# Patient Record
Sex: FEMALE | Race: BLACK OR AFRICAN AMERICAN | Employment: OTHER | URBAN - METROPOLITAN AREA
[De-identification: names, ages, dates, MRNs, and addresses within clinical notes are randomized per-mention and may not be internally consistent; named-entity substitution may affect disease eponyms.]

---

## 2018-12-11 ENCOUNTER — HOSPITAL ENCOUNTER (OUTPATIENT)
Dept: GENERAL RADIOLOGY | Age: 63
Discharge: HOME | End: 2018-12-11
Attending: INTERNAL MEDICINE
Payer: COMMERCIAL

## 2018-12-11 DIAGNOSIS — M54.9 BACK PAIN: ICD-10-CM

## 2018-12-11 DIAGNOSIS — M79.605 LEFT LEG PAIN: ICD-10-CM

## 2018-12-11 PROCEDURE — 72100 X-RAY EXAM L-S SPINE 2/3 VWS: CPT

## 2018-12-11 PROCEDURE — 73502 X-RAY EXAM HIP UNI 2-3 VIEWS: CPT

## 2019-05-07 ENCOUNTER — HOSPITAL ENCOUNTER (OUTPATIENT)
Dept: MAMMOGRAPHY | Age: 64
Discharge: HOME | End: 2019-05-07
Attending: INTERNAL MEDICINE
Payer: COMMERCIAL

## 2019-05-07 DIAGNOSIS — Z12.39 SCREENING FOR BREAST CANCER: ICD-10-CM

## 2019-05-07 PROCEDURE — 77063 BREAST TOMOSYNTHESIS BI: CPT

## 2019-07-11 ENCOUNTER — HOSPITAL ENCOUNTER (INPATIENT)
Age: 64
LOS: 3 days | Discharge: HOME | DRG: 250 | End: 2019-07-14
Attending: INTERNAL MEDICINE | Admitting: INTERNAL MEDICINE
Payer: OTHER GOVERNMENT

## 2019-07-11 ENCOUNTER — HOSPITAL ENCOUNTER (EMERGENCY)
Age: 64
Discharge: ANOTHER HOSPITAL | DRG: 250 | End: 2019-07-11
Attending: EMERGENCY MEDICINE | Admitting: EMERGENCY MEDICINE
Payer: OTHER GOVERNMENT

## 2019-07-11 VITALS
HEART RATE: 83 BPM | OXYGEN SATURATION: 96 % | SYSTOLIC BLOOD PRESSURE: 202 MMHG | TEMPERATURE: 98.8 F | WEIGHT: 190 LBS | HEIGHT: 64 IN | RESPIRATION RATE: 33 BRPM | BODY MASS INDEX: 32.44 KG/M2 | DIASTOLIC BLOOD PRESSURE: 96 MMHG

## 2019-07-11 DIAGNOSIS — I21.3 ACUTE ST ELEVATION MYOCARDIAL INFARCTION (STEMI), UNSPECIFIED ARTERY (HCC): Primary | ICD-10-CM

## 2019-07-11 PROBLEM — I10 ESSENTIAL HYPERTENSION: Status: ACTIVE | Noted: 2019-07-11

## 2019-07-11 PROBLEM — E78.2 MIXED HYPERLIPIDEMIA: Status: ACTIVE | Noted: 2019-07-11

## 2019-07-11 LAB
ACT BLD: 312 SECS (ref 70–128)
ALBUMIN SERPL-MCNC: 4.3 G/DL (ref 3.2–4.6)
ALBUMIN/GLOB SERPL: 1.1 {RATIO} (ref 1.2–3.5)
ALP SERPL-CCNC: 134 U/L (ref 50–130)
ALT SERPL-CCNC: 25 U/L (ref 12–65)
ANION GAP SERPL CALC-SCNC: 7 MMOL/L (ref 7–16)
APTT PPP: 35 SEC (ref 24.7–39.8)
AST SERPL-CCNC: 16 U/L (ref 15–37)
ATRIAL RATE: 62 BPM
BASOPHILS # BLD: 0 K/UL (ref 0–0.2)
BASOPHILS NFR BLD: 0 % (ref 0–2)
BILIRUB SERPL-MCNC: 0.2 MG/DL (ref 0.2–1.1)
BUN SERPL-MCNC: 12 MG/DL (ref 8–23)
CALCIUM SERPL-MCNC: 9.5 MG/DL (ref 8.3–10.4)
CALCULATED P AXIS, ECG09: 57 DEGREES
CALCULATED R AXIS, ECG10: 5 DEGREES
CALCULATED T AXIS, ECG11: -7 DEGREES
CHLORIDE SERPL-SCNC: 106 MMOL/L (ref 98–107)
CO2 SERPL-SCNC: 30 MMOL/L (ref 21–32)
CREAT SERPL-MCNC: 0.71 MG/DL (ref 0.6–1)
DIAGNOSIS, 93000: NORMAL
DIFFERENTIAL METHOD BLD: ABNORMAL
EOSINOPHIL # BLD: 0.3 K/UL (ref 0–0.8)
EOSINOPHIL NFR BLD: 4 % (ref 0.5–7.8)
ERYTHROCYTE [DISTWIDTH] IN BLOOD BY AUTOMATED COUNT: 14.9 % (ref 11.9–14.6)
GLOBULIN SER CALC-MCNC: 4 G/DL (ref 2.3–3.5)
GLUCOSE SERPL-MCNC: 106 MG/DL (ref 65–100)
HCT VFR BLD AUTO: 44.5 % (ref 35.8–46.3)
HGB BLD-MCNC: 13.6 G/DL (ref 11.7–15.4)
IMM GRANULOCYTES # BLD AUTO: 0 K/UL (ref 0–0.5)
IMM GRANULOCYTES NFR BLD AUTO: 0 % (ref 0–5)
INR PPP: 1
LYMPHOCYTES # BLD: 2.5 K/UL (ref 0.5–4.6)
LYMPHOCYTES NFR BLD: 30 % (ref 13–44)
MCH RBC QN AUTO: 26.8 PG (ref 26.1–32.9)
MCHC RBC AUTO-ENTMCNC: 30.6 G/DL (ref 31.4–35)
MCV RBC AUTO: 87.8 FL (ref 79.6–97.8)
MONOCYTES # BLD: 0.6 K/UL (ref 0.1–1.3)
MONOCYTES NFR BLD: 7 % (ref 4–12)
NEUTS SEG # BLD: 4.9 K/UL (ref 1.7–8.2)
NEUTS SEG NFR BLD: 59 % (ref 43–78)
NRBC # BLD: 0 K/UL (ref 0–0.2)
P-R INTERVAL, ECG05: 166 MS
PLATELET # BLD AUTO: 198 K/UL (ref 150–450)
PMV BLD AUTO: 12 FL (ref 9.4–12.3)
POTASSIUM SERPL-SCNC: 4.2 MMOL/L (ref 3.5–5.1)
PROT SERPL-MCNC: 8.3 G/DL (ref 6.3–8.2)
PROTHROMBIN TIME: 12.8 SEC (ref 11.7–14.5)
Q-T INTERVAL, ECG07: 402 MS
QRS DURATION, ECG06: 78 MS
QTC CALCULATION (BEZET), ECG08: 408 MS
RBC # BLD AUTO: 5.07 M/UL (ref 4.05–5.2)
SODIUM SERPL-SCNC: 143 MMOL/L (ref 136–145)
TROPONIN I BLD-MCNC: 0 NG/ML (ref 0.02–0.05)
TROPONIN I SERPL-MCNC: <0.02 NG/ML (ref 0.02–0.05)
VENTRICULAR RATE, ECG03: 62 BPM
WBC # BLD AUTO: 8.3 K/UL (ref 4.3–11.1)

## 2019-07-11 PROCEDURE — 99153 MOD SED SAME PHYS/QHP EA: CPT

## 2019-07-11 PROCEDURE — 74011250637 HC RX REV CODE- 250/637: Performed by: EMERGENCY MEDICINE

## 2019-07-11 PROCEDURE — 74011250636 HC RX REV CODE- 250/636: Performed by: INTERNAL MEDICINE

## 2019-07-11 PROCEDURE — C1725 CATH, TRANSLUMIN NON-LASER: HCPCS

## 2019-07-11 PROCEDURE — 84484 ASSAY OF TROPONIN QUANT: CPT

## 2019-07-11 PROCEDURE — C1769 GUIDE WIRE: HCPCS

## 2019-07-11 PROCEDURE — 93458 L HRT ARTERY/VENTRICLE ANGIO: CPT

## 2019-07-11 PROCEDURE — 77030019605

## 2019-07-11 PROCEDURE — 77030004534 HC CATH ANGI DX INFN CARD -A

## 2019-07-11 PROCEDURE — C1887 CATHETER, GUIDING: HCPCS

## 2019-07-11 PROCEDURE — 74011636320 HC RX REV CODE- 636/320: Performed by: INTERNAL MEDICINE

## 2019-07-11 PROCEDURE — 77010033678 HC OXYGEN DAILY

## 2019-07-11 PROCEDURE — B2111ZZ FLUOROSCOPY OF MULTIPLE CORONARY ARTERIES USING LOW OSMOLAR CONTRAST: ICD-10-PCS | Performed by: INTERNAL MEDICINE

## 2019-07-11 PROCEDURE — 99152 MOD SED SAME PHYS/QHP 5/>YRS: CPT

## 2019-07-11 PROCEDURE — 74011250637 HC RX REV CODE- 250/637: Performed by: INTERNAL MEDICINE

## 2019-07-11 PROCEDURE — 85730 THROMBOPLASTIN TIME PARTIAL: CPT

## 2019-07-11 PROCEDURE — 96365 THER/PROPH/DIAG IV INF INIT: CPT | Performed by: EMERGENCY MEDICINE

## 2019-07-11 PROCEDURE — 74011250636 HC RX REV CODE- 250/636

## 2019-07-11 PROCEDURE — 93005 ELECTROCARDIOGRAM TRACING: CPT | Performed by: EMERGENCY MEDICINE

## 2019-07-11 PROCEDURE — 85025 COMPLETE CBC W/AUTO DIFF WBC: CPT

## 2019-07-11 PROCEDURE — B2151ZZ FLUOROSCOPY OF LEFT HEART USING LOW OSMOLAR CONTRAST: ICD-10-PCS | Performed by: INTERNAL MEDICINE

## 2019-07-11 PROCEDURE — 96375 TX/PRO/DX INJ NEW DRUG ADDON: CPT | Performed by: EMERGENCY MEDICINE

## 2019-07-11 PROCEDURE — 77030019569 HC BND COMPR RAD TERU -B

## 2019-07-11 PROCEDURE — 96365 THER/PROPH/DIAG IV INF INIT: CPT

## 2019-07-11 PROCEDURE — 77030015766

## 2019-07-11 PROCEDURE — 85610 PROTHROMBIN TIME: CPT

## 2019-07-11 PROCEDURE — 77030020263 HC SOL INJ SOD CL0.9% LFCR 1000ML

## 2019-07-11 PROCEDURE — 92941 PRQ TRLML REVSC TOT OCCL AMI: CPT

## 2019-07-11 PROCEDURE — 74011000250 HC RX REV CODE- 250: Performed by: EMERGENCY MEDICINE

## 2019-07-11 PROCEDURE — 99285 EMERGENCY DEPT VISIT HI MDM: CPT | Performed by: EMERGENCY MEDICINE

## 2019-07-11 PROCEDURE — 4A023N7 MEASUREMENT OF CARDIAC SAMPLING AND PRESSURE, LEFT HEART, PERCUTANEOUS APPROACH: ICD-10-PCS | Performed by: INTERNAL MEDICINE

## 2019-07-11 PROCEDURE — 94760 N-INVAS EAR/PLS OXIMETRY 1: CPT

## 2019-07-11 PROCEDURE — C1894 INTRO/SHEATH, NON-LASER: HCPCS

## 2019-07-11 PROCEDURE — 85347 COAGULATION TIME ACTIVATED: CPT

## 2019-07-11 PROCEDURE — 74011250636 HC RX REV CODE- 250/636: Performed by: EMERGENCY MEDICINE

## 2019-07-11 PROCEDURE — 02703ZZ DILATION OF CORONARY ARTERY, ONE ARTERY, PERCUTANEOUS APPROACH: ICD-10-PCS | Performed by: INTERNAL MEDICINE

## 2019-07-11 PROCEDURE — 75810000275 HC EMERGENCY DEPT VISIT NO LEVEL OF CARE: Performed by: EMERGENCY MEDICINE

## 2019-07-11 PROCEDURE — 80053 COMPREHEN METABOLIC PANEL: CPT

## 2019-07-11 PROCEDURE — 65620000000 HC RM CCU GENERAL

## 2019-07-11 PROCEDURE — 74011000250 HC RX REV CODE- 250: Performed by: INTERNAL MEDICINE

## 2019-07-11 RX ORDER — NITROGLYCERIN 0.4 MG/1
0.4 TABLET SUBLINGUAL
Status: DISCONTINUED | OUTPATIENT
Start: 2019-07-11 | End: 2019-07-11 | Stop reason: HOSPADM

## 2019-07-11 RX ORDER — METOPROLOL SUCCINATE 50 MG/1
TABLET, EXTENDED RELEASE ORAL DAILY
COMMUNITY
End: 2019-07-18

## 2019-07-11 RX ORDER — GUAIFENESIN 100 MG/5ML
81 LIQUID (ML) ORAL DAILY
Status: DISCONTINUED | OUTPATIENT
Start: 2019-07-12 | End: 2019-07-14 | Stop reason: HOSPADM

## 2019-07-11 RX ORDER — MORPHINE SULFATE 2 MG/ML
2 INJECTION, SOLUTION INTRAMUSCULAR; INTRAVENOUS
Status: DISCONTINUED | OUTPATIENT
Start: 2019-07-11 | End: 2019-07-11

## 2019-07-11 RX ORDER — OXYCODONE AND ACETAMINOPHEN 5; 325 MG/1; MG/1
1 TABLET ORAL
Status: DISCONTINUED | OUTPATIENT
Start: 2019-07-11 | End: 2019-07-14 | Stop reason: HOSPADM

## 2019-07-11 RX ORDER — SODIUM CHLORIDE 0.9 % (FLUSH) 0.9 %
5-40 SYRINGE (ML) INJECTION EVERY 8 HOURS
Status: DISCONTINUED | OUTPATIENT
Start: 2019-07-11 | End: 2019-07-14 | Stop reason: HOSPADM

## 2019-07-11 RX ORDER — HYDROMORPHONE HYDROCHLORIDE 2 MG/ML
0.5 INJECTION, SOLUTION INTRAMUSCULAR; INTRAVENOUS; SUBCUTANEOUS
Status: COMPLETED | OUTPATIENT
Start: 2019-07-11 | End: 2019-07-11

## 2019-07-11 RX ORDER — MIDAZOLAM HYDROCHLORIDE 1 MG/ML
.5-2 INJECTION, SOLUTION INTRAMUSCULAR; INTRAVENOUS
Status: DISCONTINUED | OUTPATIENT
Start: 2019-07-11 | End: 2019-07-11

## 2019-07-11 RX ORDER — GUAIFENESIN 100 MG/5ML
324 LIQUID (ML) ORAL
Status: COMPLETED | OUTPATIENT
Start: 2019-07-11 | End: 2019-07-11

## 2019-07-11 RX ORDER — ROSUVASTATIN CALCIUM 20 MG/1
40 TABLET, COATED ORAL
Status: DISCONTINUED | OUTPATIENT
Start: 2019-07-11 | End: 2019-07-14 | Stop reason: HOSPADM

## 2019-07-11 RX ORDER — CLOPIDOGREL BISULFATE 75 MG/1
600 TABLET ORAL ONCE
Status: COMPLETED | OUTPATIENT
Start: 2019-07-11 | End: 2019-07-11

## 2019-07-11 RX ORDER — NITROGLYCERIN 20 MG/100ML
5 INJECTION INTRAVENOUS
Status: COMPLETED | OUTPATIENT
Start: 2019-07-11 | End: 2019-07-11

## 2019-07-11 RX ORDER — METOPROLOL SUCCINATE 50 MG/1
25 TABLET, EXTENDED RELEASE ORAL 2 TIMES DAILY
Status: DISCONTINUED | OUTPATIENT
Start: 2019-07-11 | End: 2019-07-14

## 2019-07-11 RX ORDER — HEPARIN SODIUM 10000 [USP'U]/ML
1000-10000 INJECTION, SOLUTION INTRAVENOUS; SUBCUTANEOUS
Status: DISCONTINUED | OUTPATIENT
Start: 2019-07-11 | End: 2019-07-11

## 2019-07-11 RX ORDER — LISINOPRIL 5 MG/1
5 TABLET ORAL 2 TIMES DAILY
Status: DISCONTINUED | OUTPATIENT
Start: 2019-07-11 | End: 2019-07-12

## 2019-07-11 RX ORDER — MAG HYDROX/ALUMINUM HYD/SIMETH 200-200-20
30 SUSPENSION, ORAL (FINAL DOSE FORM) ORAL AS NEEDED
Status: DISCONTINUED | OUTPATIENT
Start: 2019-07-11 | End: 2019-07-11

## 2019-07-11 RX ORDER — SODIUM CHLORIDE 9 MG/ML
75 INJECTION, SOLUTION INTRAVENOUS CONTINUOUS
Status: DISCONTINUED | OUTPATIENT
Start: 2019-07-11 | End: 2019-07-13

## 2019-07-11 RX ORDER — NITROGLYCERIN 20 MG/100ML
0-200 INJECTION INTRAVENOUS
Status: DISCONTINUED | OUTPATIENT
Start: 2019-07-11 | End: 2019-07-13

## 2019-07-11 RX ORDER — ACETAMINOPHEN 325 MG/1
650 TABLET ORAL
Status: DISCONTINUED | OUTPATIENT
Start: 2019-07-11 | End: 2019-07-14 | Stop reason: HOSPADM

## 2019-07-11 RX ORDER — CLOPIDOGREL BISULFATE 75 MG/1
75 TABLET ORAL DAILY
Status: DISCONTINUED | OUTPATIENT
Start: 2019-07-12 | End: 2019-07-14 | Stop reason: HOSPADM

## 2019-07-11 RX ORDER — LIDOCAINE HYDROCHLORIDE 10 MG/ML
2-20 INJECTION INFILTRATION; PERINEURAL
Status: DISCONTINUED | OUTPATIENT
Start: 2019-07-11 | End: 2019-07-11

## 2019-07-11 RX ORDER — HEPARIN SODIUM 200 [USP'U]/100ML
2 INJECTION, SOLUTION INTRAVENOUS CONTINUOUS
Status: DISCONTINUED | OUTPATIENT
Start: 2019-07-11 | End: 2019-07-11

## 2019-07-11 RX ORDER — NITROGLYCERIN 0.4 MG/1
0.4 TABLET SUBLINGUAL
Status: DISCONTINUED | OUTPATIENT
Start: 2019-07-11 | End: 2019-07-14 | Stop reason: HOSPADM

## 2019-07-11 RX ORDER — FENTANYL CITRATE 50 UG/ML
25-100 INJECTION, SOLUTION INTRAMUSCULAR; INTRAVENOUS
Status: DISCONTINUED | OUTPATIENT
Start: 2019-07-11 | End: 2019-07-11

## 2019-07-11 RX ORDER — PROMETHAZINE HYDROCHLORIDE 25 MG/1
25 TABLET ORAL
Status: DISCONTINUED | OUTPATIENT
Start: 2019-07-11 | End: 2019-07-14 | Stop reason: HOSPADM

## 2019-07-11 RX ORDER — HEPARIN SODIUM 5000 [USP'U]/ML
4000 INJECTION, SOLUTION INTRAVENOUS; SUBCUTANEOUS
Status: COMPLETED | OUTPATIENT
Start: 2019-07-11 | End: 2019-07-11

## 2019-07-11 RX ORDER — SODIUM CHLORIDE 0.9 % (FLUSH) 0.9 %
5-40 SYRINGE (ML) INJECTION AS NEEDED
Status: DISCONTINUED | OUTPATIENT
Start: 2019-07-11 | End: 2019-07-14 | Stop reason: HOSPADM

## 2019-07-11 RX ADMIN — NITROGLYCERIN 0.4 MG: 0.4 TABLET SUBLINGUAL at 18:31

## 2019-07-11 RX ADMIN — HEPARIN SODIUM 4000 UNITS: 10000 INJECTION INTRAVENOUS; SUBCUTANEOUS at 19:26

## 2019-07-11 RX ADMIN — PROMETHAZINE HYDROCHLORIDE 25 MG: 25 TABLET ORAL at 23:51

## 2019-07-11 RX ADMIN — LISINOPRIL 5 MG: 5 TABLET ORAL at 21:06

## 2019-07-11 RX ADMIN — NITROGLYCERIN 0.4 MG: 0.4 TABLET SUBLINGUAL at 18:37

## 2019-07-11 RX ADMIN — IOPAMIDOL 260 ML: 755 INJECTION, SOLUTION INTRAVENOUS at 20:12

## 2019-07-11 RX ADMIN — LIDOCAINE HYDROCHLORIDE 4 ML: 10 INJECTION, SOLUTION INFILTRATION; PERINEURAL at 19:26

## 2019-07-11 RX ADMIN — SODIUM CHLORIDE 75 ML/HR: 900 INJECTION, SOLUTION INTRAVENOUS at 20:31

## 2019-07-11 RX ADMIN — Medication 10 ML: at 21:09

## 2019-07-11 RX ADMIN — FENTANYL CITRATE 25 MCG: 50 INJECTION, SOLUTION INTRAMUSCULAR; INTRAVENOUS at 19:27

## 2019-07-11 RX ADMIN — MIDAZOLAM HYDROCHLORIDE 2 MG: 1 INJECTION, SOLUTION INTRAMUSCULAR; INTRAVENOUS at 19:27

## 2019-07-11 RX ADMIN — NITROGLYCERIN 5 MCG/MIN: 20 INJECTION INTRAVENOUS at 18:33

## 2019-07-11 RX ADMIN — NITROGLYCERIN 0.15 MG: 200 INJECTION, SOLUTION INTRAVENOUS at 19:54

## 2019-07-11 RX ADMIN — TIROFIBAN 0.15 MCG/KG/MIN: 5 INJECTION, SOLUTION INTRAVENOUS at 19:58

## 2019-07-11 RX ADMIN — ASPIRIN 81 MG 324 MG: 81 TABLET ORAL at 18:31

## 2019-07-11 RX ADMIN — ALUMINUM HYDROXIDE, MAGNESIUM HYDROXIDE, AND SIMETHICONE 30 ML: 200; 200; 20 SUSPENSION ORAL at 20:10

## 2019-07-11 RX ADMIN — METOPROLOL SUCCINATE 25 MG: 50 TABLET, EXTENDED RELEASE ORAL at 21:03

## 2019-07-11 RX ADMIN — CLOPIDOGREL BISULFATE 600 MG: 75 TABLET ORAL at 20:10

## 2019-07-11 RX ADMIN — HEPARIN SODIUM 2 ML: 10000 INJECTION, SOLUTION INTRAVENOUS; SUBCUTANEOUS at 19:26

## 2019-07-11 RX ADMIN — HYDROMORPHONE HYDROCHLORIDE 0.5 MG: 2 INJECTION INTRAMUSCULAR; INTRAVENOUS; SUBCUTANEOUS at 18:59

## 2019-07-11 RX ADMIN — ROSUVASTATIN CALCIUM 40 MG: 20 TABLET, COATED ORAL at 21:03

## 2019-07-11 RX ADMIN — TIROFIBAN 0.15 MCG/KG/MIN: 5 INJECTION, SOLUTION INTRAVENOUS at 20:51

## 2019-07-11 RX ADMIN — HEPARIN SODIUM 2 ML/HR: 200 INJECTION, SOLUTION INTRAVENOUS at 19:20

## 2019-07-11 RX ADMIN — TIROFIBAN 2100 MCG: 3.75 INJECTION, SOLUTION INTRAVENOUS at 19:58

## 2019-07-11 RX ADMIN — HEPARIN SODIUM 4000 UNITS: 5000 INJECTION INTRAVENOUS; SUBCUTANEOUS at 18:32

## 2019-07-11 RX ADMIN — NITROGLYCERIN 30 MCG/MIN: 20 INJECTION INTRAVENOUS at 20:37

## 2019-07-11 NOTE — ED PROVIDER NOTES
81-year-old female 45 minutes substernal chest pain and pressure. No radiation. No nausea or vomiting or diaphoresis. No shortness of breath or pleurisy. No fever chills or cough. Perhaps 1 or 2 pains like this in the past.  Patient states catheterization of heart more than 10 years ago without stents. History of hypertension. No history of diabetes. No history DVT or PE. The history is provided by the patient. Chest Pain    This is a new problem. The current episode started less than 1 hour ago. The problem has not changed since onset. The pain is associated with exertion. The pain is moderate. The quality of the pain is described as pressure-like. The pain does not radiate. Pertinent negatives include no abdominal pain, no back pain, no cough, no diaphoresis, no fever, no headaches, no nausea, no palpitations, no shortness of breath and no vomiting. She has tried nothing for the symptoms. Risk factors include hypertension. Her past medical history is significant for aneurysm. Her past medical history does not include DVT. Procedural history includes cardiac catheterization. Pertinent negatives include no cardiac stents. Past Medical History:   Diagnosis Date    Hypertension        Past Surgical History:   Procedure Laterality Date    HX HYSTERECTOMY      2001         History reviewed. No pertinent family history.     Social History     Socioeconomic History    Marital status:      Spouse name: Not on file    Number of children: Not on file    Years of education: Not on file    Highest education level: Not on file   Occupational History    Not on file   Social Needs    Financial resource strain: Not on file    Food insecurity:     Worry: Not on file     Inability: Not on file    Transportation needs:     Medical: Not on file     Non-medical: Not on file   Tobacco Use    Smoking status: Never Smoker   Substance and Sexual Activity    Alcohol use: Yes     Comment: occ    Drug use: No    Sexual activity: Not on file   Lifestyle    Physical activity:     Days per week: Not on file     Minutes per session: Not on file    Stress: Not on file   Relationships    Social connections:     Talks on phone: Not on file     Gets together: Not on file     Attends [employer] service: Not on file     Active member of club or organization: Not on file     Attends meetings of clubs or organizations: Not on file     Relationship status: Not on file    Intimate partner violence:     Fear of current or ex partner: Not on file     Emotionally abused: Not on file     Physically abused: Not on file     Forced sexual activity: Not on file   Other Topics Concern    Not on file   Social History Narrative    Not on file         ALLERGIES: [place]yer oil-1000 [omega-3 fatty acids] and Penicillin v    Review of Systems   Constitutional: Negative for chills, diaphoresis and fever. Respiratory: Negative for cough and shortness of breath. Cardiovascular: Positive for chest pain. Negative for palpitations. Gastrointestinal: Negative for abdominal pain, nausea and vomiting. Musculoskeletal: Negative for back pain and neck pain. Skin: Negative for color change and rash. Neurological: Negative for syncope and headaches. All other systems reviewed and are negative. Vitals:    07/11/19 1825 07/11/19 1837   BP: (!) 223/111 (!) 201/117   Pulse: 69 81   Resp: 20    Temp: 98.8 °F (37.1 °C)    SpO2: 98%    Weight: 86.2 kg (190 lb)    Height: 5' 4\" (1.626 m)             Physical Exam   Constitutional: She is oriented to person, place, and time. She appears well-developed and well-nourished. No distress. HENT:   Head: Normocephalic and atraumatic. Right Ear: External ear normal.   Left Ear: External ear normal.   Mouth/Throat: Oropharynx is clear and moist. No oropharyngeal exudate. Eyes: Pupils are equal, round, and reactive to light. Conjunctivae and EOM are normal.   Neck: Normal range of motion.  Neck supple. Cardiovascular: Normal rate, regular rhythm and intact distal pulses. No murmur heard. Pulmonary/Chest: Breath sounds normal. No respiratory distress. Abdominal: Soft. Bowel sounds are normal. She exhibits no mass. There is no tenderness. There is no rebound and no guarding. No hernia. Neurological: She is alert and oriented to person, place, and time. Gait normal.   Nl speech   Skin: Skin is warm and dry. Psychiatric: She has a normal mood and affect. Her speech is normal.   Nursing note and vitals reviewed. MDM  Number of Diagnoses or Management Options  Diagnosis management comments: Check EKG and serial troponins. Pain control. Blood pressure control. Amount and/or Complexity of Data Reviewed  Clinical lab tests: ordered and reviewed  Tests in the medicine section of CPT®: ordered and reviewed  Discuss the patient with other providers: yes (Discussed with cardiology. Code STEMI called immediately upon review of EKG and brief discussion with patient.)  Independent visualization of images, tracings, or specimens: yes (EKG reveals ST elevation in V2, aVL. Nonspecific changes in 1. Inferior ST depression.)    Risk of Complications, Morbidity, and/or Mortality  Presenting problems: high  Diagnostic procedures: minimal  Management options: low    Patient Progress  Patient progress: stable         Procedures      6:42 PM  Patient started on sublingual then IV nitroglycerin for hypertensive emergency.

## 2019-07-11 NOTE — ED TRIAGE NOTES
Pt states chest pain for about 30 mins that started while sitting down. Denies cardiac hx but did see [facility] cardiology a few years ago for chest pain. States she was not nauseated but did have some SOB. Pt states she has not taken bystolic in the past month due to how it makes her feel.

## 2019-07-11 NOTE — PROGRESS NOTES
TRANSFER - OUT REPORT:    Verbal report given to Eva HOGAN(name) on [facility]  being transferred to   [id number] for urgent transfer       Report consisted of patients Situation, Background, Assessment and   Recommendations(SBAR). Information from the following report(s) ED Summary, Procedure Summary, MAR and Cardiac Rhythm SR w/ DEMETRIUS was reviewed with the receiving nurse. Lines:   Peripheral IV 07/11/19 Right Antecubital (Active)   Site Assessment Clean, dry, & intact 7/11/2019  6:35 PM   Phlebitis Assessment 0 7/11/2019  6:35 PM   Infiltration Assessment 0 7/11/2019  6:35 PM   Dressing Status Clean, dry, & intact 7/11/2019  6:35 PM       Peripheral IV 07/11/19 Left Antecubital (Active)   Site Assessment Clean, dry, & intact 7/11/2019  6:35 PM   Phlebitis Assessment 0 7/11/2019  6:35 PM   Infiltration Assessment 0 7/11/2019  6:35 PM   Dressing Status Clean, dry, & intact 7/11/2019  6:35 PM        Opportunity for questions and clarification was provided.       Patient transported with:   Monitor  O2 @ 2 liters  Registered Nurse     STEMI Dr Bessie Stubbs  POBA Diag  Right radial access - TR band @ 2000 w/ 12 ml air in band - site C/D/I  Heparin 2000 units IA @ 1925  Heparin 4000 units IV @ 1926  ACT @ 1930 - 312  Versed 2 mg IV  Fent 50 mcg IV  Dilaudid 1 mg IV  Plavix 600 mg PO  Mylanta 30 ml PO

## 2019-07-12 ENCOUNTER — APPOINTMENT (OUTPATIENT)
Dept: ULTRASOUND IMAGING | Age: 64
DRG: 250 | End: 2019-07-12
Attending: INTERNAL MEDICINE
Payer: OTHER GOVERNMENT

## 2019-07-12 LAB
ALBUMIN SERPL-MCNC: 3.5 G/DL (ref 3.2–4.6)
ALBUMIN/GLOB SERPL: 1 {RATIO} (ref 1.2–3.5)
ALP SERPL-CCNC: 109 U/L (ref 50–136)
ALT SERPL-CCNC: 21 U/L (ref 12–65)
ANION GAP SERPL CALC-SCNC: 7 MMOL/L (ref 7–16)
AST SERPL-CCNC: 44 U/L (ref 15–37)
ATRIAL RATE: 64 BPM
BASOPHILS # BLD: 0 K/UL (ref 0–0.2)
BASOPHILS NFR BLD: 0 % (ref 0–2)
BILIRUB SERPL-MCNC: 0.3 MG/DL (ref 0.2–1.1)
BUN SERPL-MCNC: 11 MG/DL (ref 8–23)
CALCIUM SERPL-MCNC: 8.8 MG/DL (ref 8.3–10.4)
CALCULATED P AXIS, ECG09: 55 DEGREES
CALCULATED R AXIS, ECG10: 10 DEGREES
CALCULATED T AXIS, ECG11: -17 DEGREES
CHLORIDE SERPL-SCNC: 107 MMOL/L (ref 98–107)
CHOLEST SERPL-MCNC: 179 MG/DL
CO2 SERPL-SCNC: 28 MMOL/L (ref 21–32)
CREAT SERPL-MCNC: 0.73 MG/DL (ref 0.6–1)
D DIMER PPP FEU-MCNC: 0.34 UG/ML(FEU)
DIAGNOSIS, 93000: NORMAL
DIFFERENTIAL METHOD BLD: ABNORMAL
EOSINOPHIL # BLD: 0 K/UL (ref 0–0.8)
EOSINOPHIL NFR BLD: 0 % (ref 0.5–7.8)
ERYTHROCYTE [DISTWIDTH] IN BLOOD BY AUTOMATED COUNT: 14.8 % (ref 11.9–14.6)
GLOBULIN SER CALC-MCNC: 3.5 G/DL (ref 2.3–3.5)
GLUCOSE SERPL-MCNC: 132 MG/DL (ref 65–100)
HCT VFR BLD AUTO: 39 % (ref 35.8–46.3)
HDLC SERPL-MCNC: 69 MG/DL (ref 40–60)
HDLC SERPL: 2.6 {RATIO}
HGB BLD-MCNC: 12.1 G/DL (ref 11.7–15.4)
IMM GRANULOCYTES # BLD AUTO: 0 K/UL (ref 0–0.5)
IMM GRANULOCYTES NFR BLD AUTO: 0 % (ref 0–5)
LDLC SERPL CALC-MCNC: 101 MG/DL
LIPID PROFILE,FLP: ABNORMAL
LYMPHOCYTES # BLD: 0.8 K/UL (ref 0.5–4.6)
LYMPHOCYTES NFR BLD: 8 % (ref 13–44)
MAGNESIUM SERPL-MCNC: 2.3 MG/DL (ref 1.8–2.4)
MCH RBC QN AUTO: 27.7 PG (ref 26.1–32.9)
MCHC RBC AUTO-ENTMCNC: 31 G/DL (ref 31.4–35)
MCV RBC AUTO: 89.2 FL (ref 79.6–97.8)
MONOCYTES # BLD: 0.3 K/UL (ref 0.1–1.3)
MONOCYTES NFR BLD: 3 % (ref 4–12)
NEUTS SEG # BLD: 9.2 K/UL (ref 1.7–8.2)
NEUTS SEG NFR BLD: 88 % (ref 43–78)
NRBC # BLD: 0 K/UL (ref 0–0.2)
P-R INTERVAL, ECG05: 176 MS
PLATELET # BLD AUTO: 166 K/UL (ref 150–450)
PMV BLD AUTO: 12.3 FL (ref 9.4–12.3)
POTASSIUM SERPL-SCNC: 4.2 MMOL/L (ref 3.5–5.1)
PROT SERPL-MCNC: 7 G/DL (ref 6.3–8.2)
Q-T INTERVAL, ECG07: 424 MS
QRS DURATION, ECG06: 80 MS
QTC CALCULATION (BEZET), ECG08: 437 MS
RBC # BLD AUTO: 4.37 M/UL (ref 4.05–5.2)
SODIUM SERPL-SCNC: 142 MMOL/L (ref 136–145)
TRIGL SERPL-MCNC: 45 MG/DL (ref 35–150)
TROPONIN I SERPL-MCNC: 15.6 NG/ML (ref 0.02–0.05)
VENTRICULAR RATE, ECG03: 64 BPM
VLDLC SERPL CALC-MCNC: 9 MG/DL (ref 6–23)
WBC # BLD AUTO: 10.5 K/UL (ref 4.3–11.1)

## 2019-07-12 PROCEDURE — 36415 COLL VENOUS BLD VENIPUNCTURE: CPT

## 2019-07-12 PROCEDURE — 85379 FIBRIN DEGRADATION QUANT: CPT

## 2019-07-12 PROCEDURE — 80061 LIPID PANEL: CPT

## 2019-07-12 PROCEDURE — 94760 N-INVAS EAR/PLS OXIMETRY 1: CPT

## 2019-07-12 PROCEDURE — 74011250637 HC RX REV CODE- 250/637: Performed by: INTERNAL MEDICINE

## 2019-07-12 PROCEDURE — 65620000000 HC RM CCU GENERAL

## 2019-07-12 PROCEDURE — 84484 ASSAY OF TROPONIN QUANT: CPT

## 2019-07-12 PROCEDURE — 77030020263 HC SOL INJ SOD CL0.9% LFCR 1000ML

## 2019-07-12 PROCEDURE — 80053 COMPREHEN METABOLIC PANEL: CPT

## 2019-07-12 PROCEDURE — 74011250636 HC RX REV CODE- 250/636: Performed by: INTERNAL MEDICINE

## 2019-07-12 PROCEDURE — 83735 ASSAY OF MAGNESIUM: CPT

## 2019-07-12 PROCEDURE — 93306 TTE W/DOPPLER COMPLETE: CPT

## 2019-07-12 PROCEDURE — 85025 COMPLETE CBC W/AUTO DIFF WBC: CPT

## 2019-07-12 PROCEDURE — 93005 ELECTROCARDIOGRAM TRACING: CPT | Performed by: INTERNAL MEDICINE

## 2019-07-12 PROCEDURE — 93970 EXTREMITY STUDY: CPT

## 2019-07-12 PROCEDURE — 77010033678 HC OXYGEN DAILY

## 2019-07-12 PROCEDURE — 74011000250 HC RX REV CODE- 250: Performed by: INTERNAL MEDICINE

## 2019-07-12 RX ORDER — LISINOPRIL 20 MG/1
20 TABLET ORAL 2 TIMES DAILY
Status: DISCONTINUED | OUTPATIENT
Start: 2019-07-13 | End: 2019-07-14

## 2019-07-12 RX ORDER — LISINOPRIL 5 MG/1
10 TABLET ORAL 2 TIMES DAILY
Status: DISCONTINUED | OUTPATIENT
Start: 2019-07-12 | End: 2019-07-12

## 2019-07-12 RX ORDER — LISINOPRIL 5 MG/1
10 TABLET ORAL ONCE
Status: COMPLETED | OUTPATIENT
Start: 2019-07-12 | End: 2019-07-12

## 2019-07-12 RX ADMIN — METOPROLOL SUCCINATE 25 MG: 50 TABLET, EXTENDED RELEASE ORAL at 08:35

## 2019-07-12 RX ADMIN — Medication 10 ML: at 21:07

## 2019-07-12 RX ADMIN — LISINOPRIL 10 MG: 5 TABLET ORAL at 17:45

## 2019-07-12 RX ADMIN — METOPROLOL SUCCINATE 25 MG: 50 TABLET, EXTENDED RELEASE ORAL at 17:45

## 2019-07-12 RX ADMIN — NITROGLYCERIN 40 MCG/MIN: 20 INJECTION INTRAVENOUS at 21:08

## 2019-07-12 RX ADMIN — SODIUM CHLORIDE 75 ML/HR: 900 INJECTION, SOLUTION INTRAVENOUS at 21:08

## 2019-07-12 RX ADMIN — Medication 20 ML: at 14:00

## 2019-07-12 RX ADMIN — LISINOPRIL 10 MG: 5 TABLET ORAL at 19:01

## 2019-07-12 RX ADMIN — ASPIRIN 81 MG 81 MG: 81 TABLET ORAL at 08:35

## 2019-07-12 RX ADMIN — ACETAMINOPHEN 650 MG: 325 TABLET, FILM COATED ORAL at 19:00

## 2019-07-12 RX ADMIN — Medication 10 ML: at 05:08

## 2019-07-12 RX ADMIN — ACETAMINOPHEN 650 MG: 325 TABLET, FILM COATED ORAL at 07:11

## 2019-07-12 RX ADMIN — ROSUVASTATIN CALCIUM 40 MG: 20 TABLET, COATED ORAL at 21:06

## 2019-07-12 RX ADMIN — TIROFIBAN 0.15 MCG/KG/MIN: 5 INJECTION, SOLUTION INTRAVENOUS at 11:38

## 2019-07-12 RX ADMIN — CLOPIDOGREL BISULFATE 75 MG: 75 TABLET ORAL at 08:35

## 2019-07-12 RX ADMIN — SODIUM CHLORIDE 75 ML/HR: 900 INJECTION, SOLUTION INTRAVENOUS at 08:00

## 2019-07-12 RX ADMIN — LISINOPRIL 10 MG: 5 TABLET ORAL at 08:35

## 2019-07-12 NOTE — PROGRESS NOTES
7/12/2019 8:44 AM    Admit Date: 7/11/2019    Admit Diagnosis: STEMI (ST elevation myocardial infarction) ([facility] [I21.3]      Subjective:   Mild cp- no sob      Objective:      Visit Vitals  /66   Pulse 60   Temp 97.7 °F (36.5 °C)   Resp 12   Wt 92.6 kg (204 lb 2.3 oz)   SpO2 98%   BMI 35.04 kg/m²       Physical Exam:  Lum Galvez, Well Nourished, No Acute Distress, Alert & Oriented x 3, appropriate mood. Neck- supple, no JVD  CV- regular rate and rhythm no MRG  Lung- clear bilaterally  Abd- soft, nontender, nondistended  Ext- no edema bilaterally. Skin- warm and dry        Data Review:   Recent Labs     07/12/19  0322 07/11/19  1825    143   K 4.2 4.2   BUN 11 12   CREA 0.73 0.71   WBC 10.5 8.3   HGB 12.1 13.6   HCT 39.0 44.5    198   INR  --  1.0   HDL 69*  --        Assessment/Plan:     Active Problems:    STEMI (ST elevation myocardial infarction) ([facility] (7/11/2019)? cad- small diagonal- continued cp- continue aggrastat- wean ntg- increase ace      Essential hypertension (7/11/2019)Stable. Continue current medical therapy. Mixed hyperlipidemia (7/11/2019)Stable. Continue current medical therapy.

## 2019-07-12 NOTE — PROGRESS NOTES
Patient with increased BP and some mild CP. NTG already restarted. Discussed with Dr. Thea Mejia. Orders received to further increase lisinopril dose.

## 2019-07-12 NOTE — PROGRESS NOTES
Patient complaining of persistent tingling in LUE. BP cuff location changed to R leg without relief, however tingling is not getting worse. Pulse present, extremity warm, all other elements of neuro assessment WDL. Blanquita Saul NP updated- will notify if worsens or if new symptoms present.

## 2019-07-12 NOTE — PROGRESS NOTES
Cardiac Rehab: Spoke with patient regarding referral to cardiac rehab. Patient meets admission criteria based on STEMI with intervention(07/11/19). Written information about Cardiac Rehab given and reviewed with patient. Discussed lifestyle modifications to promote cardiac wellness. Patient indicated that she is unsure if she wants to participate in the cardiac rehab program. We will follow up with her after discharge also. Her Cardiologist is Dr. Bessie Stubbs.       Thank you,  NICOLAS Edwards, RN  Cardiopulmonary Rehabilitation Nurse Liaison  [facility]

## 2019-07-12 NOTE — PROGRESS NOTES
Bedside and Verbal shift change report given to [address] (oncoming nurse) by Tyrell Ceja (offgoing nurse). Report included the following information SBAR, Kardex, ED Summary, Procedure Summary, Intake/Output, MAR, Recent Results, Cardiac Rhythm NSR/SB and Alarm Parameters .     R radial site assessed  aggrastat signed off

## 2019-07-12 NOTE — PROGRESS NOTES
TRANSFER - IN REPORT:    Verbal report received from Eli Toussaint RN(name) on Jairo Oliver  being received from Cath lab (unit) for routine progression of care      Report consisted of patients Situation, Background, Assessment and   Recommendations(SBAR). Information from the following report(s) SBAR, Kardex, ED Summary, Procedure Summary, MAR and Cardiac Rhythm NSR was reviewed with the receiving nurse. Opportunity for questions and clarification was provided. Assessment completed upon patients arrival to unit and care assumed.

## 2019-07-12 NOTE — PROGRESS NOTES
Dr. Nathalie Fraga updated on patient condition overnight Orders received for BMP and proceed with maher removal

## 2019-07-12 NOTE — PROGRESS NOTES
Pt has been admitted to CCU after STEMI. Per chart review pt does have a heart hx as far back as 10 years ago but has remained active and  independent with ADL's. No consults or needs expressed at this time but CM will follow pt plan of care and assist if supportive care needs arise.   Care Management Interventions  PCP Verified by CM: (Dr Choi Patient)  Mode of Transport at Discharge: (family)  Transition of Care Consult (CM Consult): Discharge Planning(Pt is insured with pharmacy benefits.  )  Discharge Durable Medical Equipment: No  Physical Therapy Consult: No  Occupational Therapy Consult: No  Speech Therapy Consult: No  Current Support Network: Lives with Spouse, Family Lives Nearby(Pt normally manages her ADL's.)  Confirm Follow Up Transport: Family  Plan discussed with Pt/Family/Caregiver: Yes  Freedom of Choice Offered: Yes  Temple Resource Information Provided?: No  Discharge Location  Discharge Placement: Home

## 2019-07-12 NOTE — H&P
[facility] CARDIOLOGY History & Physical                   Subjective:     Patient is a 82-year-old female who presented to [facility] with 45 minutes of substernal chest discomfort described as a pressure. No radiation. Mild associated dyspnea. She has cardiac factors including hypertension. She states she had a cardiac catheterization 10 years ago which did not show any significant coronary disease. She does not smoke or have diabetes. She was transferred emergently for cardiac catheterization via STEMI protocol. This showed abrupt occlusion of the mid to distal first diagonal artery. The remaining coronary arteries showed minimal disease. She underwent angioplasty with a 2.0 mm balloon with restoration of flow but remained with distal vessel occlusion in the distal aspect of the vessel. It is unclear whether this represented a spontaneous coronary artery dissection or a embolic event. Her pain has decreased  from 8/10 to 3/10 post intervention. EF 45-50% with anterolateral hypokinesis. Past Medical History:   Diagnosis Date    Hypertension       Past Surgical History:   Procedure Laterality Date    HX HYSTERECTOMY      2001      Allergies   Allergen Reactions    [place] Oil-1000 [Omega-3 Fatty Acids] Swelling    Penicillin V Hives     Social History     Tobacco Use    Smoking status: Never Smoker   Substance Use Topics    Alcohol use: Yes     Comment: occ      FH: No family history on file. Review of Systems   Constitutional: Negative for chills and fever. HENT: Negative for tinnitus. Eyes: Negative for blurred vision. Respiratory: Negative for cough and shortness of breath. Cardiovascular: Positive for chest pain. Negative for orthopnea. Gastrointestinal: Negative for abdominal pain and nausea. Genitourinary: Negative for dysuria. Musculoskeletal: Positive for joint pain. Skin: Negative for rash.    Neurological: Negative for tremors, sensory change and headaches. Endo/Heme/Allergies: Does not bruise/bleed easily. Psychiatric/Behavioral: Negative for depression and suicidal ideas. Objective:       Visit Vitals  /89   Pulse 65   Resp 17   Wt 92.6 kg (204 lb 2.3 oz)   SpO2 97%   BMI 35.04 kg/m²       07/11 1901 - 07/12 0700  In: -   Out: 600 [Urine:600]  No intake/output data recorded. Physical Exam   Constitutional: She is oriented to person, place, and time and well-developed, well-nourished, and in no distress. HENT:   Head: Atraumatic. Eyes: Pupils are equal, round, and reactive to light. Conjunctivae are normal.   Neck: No JVD present. No thyromegaly present. Cardiovascular: Normal rate and regular rhythm. No murmur heard. Pulmonary/Chest: Effort normal and breath sounds normal.   Abdominal: Soft. Bowel sounds are normal. She exhibits no distension. There is no tenderness. Musculoskeletal: She exhibits no edema. Neurological: She is alert and oriented to person, place, and time. Skin: Skin is warm. No rash noted. Psychiatric: Mood normal.         ECG: Sinus rhythm with lateral ST elevation and inferior ST depression. Data Review:   Labs:   Recent Labs     07/11/19  1825      K 4.2   BUN 12   CREA 0.71   *   WBC 8.3   HGB 13.6   HCT 44.5      INR 1.0      No results found for: TROIQ, GUILLERMINA, TROPT, TNIPOC        Assessment/Plan:   Active Problems:    STEMI (ST elevation myocardial infarction) ([facility] (7/11/2019)  Patient with a lateral ST elevation myocardial infarction due to occlusion in the mid to distal segment of a moderate size first diagonal artery. She has minimal atherosclerosis at time of catheterization and I suspect this may well represented a spontaneous coronary artery dissection. A embolism to her coronary arteries is also a possibility. We will continue intravenous tirofiban which was started in the Cath Lab for 24 hours.   Check echocardiogram with bubble study to exclude intracardiac shunting. Check d-dimer and lower extremity duplex to look for embolic source. Monitor on telemetry for atrial fibrillation. Plan aspirin therapy indefinitely and Plavix for 1 year. Continue intravenous nitroglycerin overnight and then changed to nitroglycerin paste if she is stable. She will be admitted to the CCU for close monitoring. Essential hypertension (7/11/2019)  Patient was hypertensive upon her presentation. She takes Toprol 50 mg at home. We'll restart Toprol 25 mg twice a day and add ACE inhibitor therapy. Intravenous nitroglycerin with goal blood pressure less than 130. Adjust blood pressure regimen for appropriate control. Mixed hyperlipidemia (7/11/2019)  Start Crestor 40 mg PO QHS.                 ALISHA WintersC  7/11/2019  9:27 PM

## 2019-07-12 NOTE — PROGRESS NOTES
Patient arrived to CCU with cath lab RN on monitor, VSS. Aggrastat continued on admission. R radial site assessed- TR band in place with 12 ml per Amie RN, site c/d/i with no bleeding, no hematoma, extremity warm and sensation present. Voided on bed pan and full CHG bath administered. Dual skin assessment: R radial site post PCI (see above), skin otherwise intact and without abnormalities.

## 2019-07-12 NOTE — PROCEDURES
[facility]  CARDIAC CATH    Name:  Gemma Uriarte  MR#:  [id number]  :  1955  ACCOUNT #:  [de-identified]  DATE OF SERVICE:  2019      PREOPERATIVE DIAGNOSIS:  Lateral ST elevation myocardial infarction. POSTOPERATIVE DIAGNOSIS:  Occluded mid to distal first diagonal artery. PROCEDURES PERFORMED:  1. Left heart catheterization, selective coronary arteriography, and left ventriculogram via the right radial approach. 2.  Balloon angioplasty of the first diagonal artery with a 2 mm balloon. SURGEON:  Galilea Smith MD    ASSISTANT:  None. ANESTHESIA:  The patient received moderate supervised conscious sedation administered by Gopi Miller. Sedation:  The patient received a total of 2 mg of Versed, 25 mcg of fentanyl, 1 mg of Dilaudid. Sedation start time was 07:25 p.m. with a procedure completion time of 08:02 p.m. COMPLICATIONS:  None. FINDINGS:  As below. SPECIMENS REMOVED:  None. ESTIMATED BLOOD LOSS:  5 mL. IMPLANTS:  None. TECHNICAL FINDINGS:  After informed consent was obtained, the patient was brought to the cardiac catheterization lab. The right radial artery was prepped and draped in the usual sterile fashion. Utilizing a modified Seldinger technique and a micropuncture needle, the right radial artery was entered. A 6-Filipino Terumo slender sheath was placed without difficulty. A radial cocktail consisting of 2000 units of heparin, 2 mg of verapamil, and 200 mcg of nitroglycerin was administered. A 5-Filipino Tiger 4 catheter was used to select and engage the ostium of the left main coronary artery and right coronary artery respectively. Selective injection verification was performed. A pigtail catheter was used to cross the aortic valve and the left ventricle. Hemodynamic measurements and left ventriculogram were obtained. Left ventricular aortic pressure gradient was obtained by pullback technique.     The patient was referred for PCI of the first diagonal artery. Please see procedure note for details. CONTRAST:  Isovue 260. HEMODYNAMIC RESULTS:  1. Aortic pressure 150/90 with a mean of 93.  2.  Left ventricular end-diastolic pressure was 28.  3.  There was no significant gradient across the aortic valve. ANGIOGRAPHIC RESULTS:  1. Left main coronary artery:  Large-caliber vessel. Angiographically normal.  2.  LAD:  It is a medium-caliber vessel. It is tortuous in the mid segment with a likely intramyocardial segment. There is very minimal systolic compression but adequate lumen and diastole. 10-20% luminal irregularities in the mid LAD. 3.  First diagonal artery:  Appears to be angiographically normal in the proximal mid segment. At the mid to distal transition, the vessel is abruptly occluded. 4.  Ramus intermedius:  Medium-caliber vessel. Normal.  5.  Left circumflex:  Medium-caliber vessel. Normal.  6.  First obtuse marginal:  Medium-caliber vessel. Normal.  7.  Right coronary artery: It is a large-caliber dominant vessel. Angiographically, normal.  8.  Right PDA:  Medium-caliber vessel. Normal.  9.  Right posterolateral branch:  Small-caliber vessel. Normal.  10.  Left ventriculogram performed in the ALONSO projection shows mildly reduced LV systolic function. EF 45-50%. There is moderate anterolateral hypokinesis. Aortic root is nondilated. No evidence of aortic dissection. CONCLUSION:  1. Abruptly occluded first diagonal artery. 2.  Mildly reduced LV systolic function. PLAN:  Proceed with PCI. PCI NOTE:    First diagonal artery:  Pre-stenosis 100% in the mid to distal segment. Postangioplasty, the mid to distal segment was patent with less than 10% stenosis. The far distal segment was occluded. TECHNICAL FINDINGS:  The patient was given intravenous heparin. ACT was greater than 300. XB3 guide was used to select and engage the ostium of the left main coronary artery.   A Whisper wire was utilized to enter the distal diagonal.  This was very tortuous vessel and very difficult to wire. Ultimately, a 2 x 12 mm balloon was positioned at the area of occlusion and gentle balloon dilatation was performed up to 6 atmospheres. With this, there was restoration of flow into the distal diagonal vessel. The vessel was very small caliber distally and further attempts at further angioplasty downstream to the occlusion was not performed given the small vessel size. The wire was removed and 200 mcg of intracoronary nitroglycerin was given. CONCLUSION:  Successful balloon angioplasty of the mid to distal first diagonal artery with restoration of flow in the distal segment. PLAN:  Uncertain if her occlusion represents a spontaneous coronary artery dissection versus an embolic event to her coronary arteries. She does not have significant atherosclerosis in any of the coronary arteries and there is no significant residual atherosclerosis at the segment that was ballooned in the mid to distal transition. We will evaluate for possible embolic source with echo with bubble study, monitoring for atrial fibrillation, lower extremity duplex for embolic source. Would treat for 24 hours with anticoagulation. If this is a spontaneous coronary dissection, it would likely heal over the next 4-6 weeks.       MD KAYLEN [id number]  D:  07/11/2019 20:32  T:  07/11/2019 20:40  JOB #:  [id number]  CC:  Jenaro Mosley MD

## 2019-07-12 NOTE — CONSULTS
LEAPFROG PROTOCOL NOTE    Luis M Gardner  7/12/2019    The patient is currently in the critical care setting managed by Dr. Babara Collet with STEMI. The patient's chart is reviewed and the patient is discussed with the staff. Currently on NC 2L. Patient is currently hemodynamically stable. Patient has no needs identified for Intensivist management in the critical care setting at this time. Please notify us if can be of assistance. No charge billed to the patient. Thank you.     Rian Bence, NP     Agree with the above  Sol Estrada MD

## 2019-07-12 NOTE — PROGRESS NOTES
Bedside and Verbal shift change report given to Mohsen Lyons RN (oncoming nurse) by Velasquez Armendariz (offgoing nurse). Report included the following information SBAR, Kardex, Intake/Output, MAR, Recent Results, Cardiac Rhythm NSR and Alarm Parameters . Patient returned to bed. Tylenol requested for headache. Hypertensive and mild CP- titrating nitro gtt as indicated.  R radial site without bleeding or hematoma, mild ecchymosis noted

## 2019-07-12 NOTE — PROGRESS NOTES
Initial visit made to patient and a prayer was provided for the patient and her , Dara Ormond. A  card was left.         [employer]

## 2019-07-12 NOTE — PROCEDURES
Brief Cardiac Procedure Note    Patient: Sherlyn Doan MRN: [id number]  SSN: [id number]    YOB: 1955  Age: 60 y.o. Sex: female      Date of Procedure: 7/11/2019     Pre-procedure Diagnosis: Lateral STEMI    Post-procedure Diagnosis: Coronary artery disease    Procedure: Left Heart Catheterization with PCI    Brief Description of Procedure: As above    Performed By: Galilea Smith MD     Assistants: None    Anesthesia: Moderate Sedation    Estimated Blood Loss: Less than 10 mL      Specimens: None    Implants: None    Findings:  Minimal CAD with flush occluded mid to distal diagonal.  ? Dissection (SCAD) vs embolic event. Able to wire and use 2.0 balloon to re-establish flow to distal diagonal but far distal vessel remains occluded. CP improved but not resolved. IV heparin and aggrastat. Plavix 600 mg given. Check Echo with bubble study. LE duplex D-dimer and telemetry to evaluate for embolic source. May need event monitor at discharge to look for afib. Aggrastat 42-44 hours    Complications: None    Recommendations: Continue medical therapy.     Signed By: Galilea Smith MD     July 11, 2019

## 2019-07-13 PROCEDURE — 65660000000 HC RM CCU STEPDOWN

## 2019-07-13 PROCEDURE — 74011250637 HC RX REV CODE- 250/637: Performed by: INTERNAL MEDICINE

## 2019-07-13 PROCEDURE — 77030020263 HC SOL INJ SOD CL0.9% LFCR 1000ML

## 2019-07-13 PROCEDURE — 74011250636 HC RX REV CODE- 250/636: Performed by: INTERNAL MEDICINE

## 2019-07-13 RX ORDER — AMLODIPINE BESYLATE 5 MG/1
5 TABLET ORAL DAILY
Status: DISCONTINUED | OUTPATIENT
Start: 2019-07-13 | End: 2019-07-14 | Stop reason: HOSPADM

## 2019-07-13 RX ORDER — AMLODIPINE BESYLATE 5 MG/1
5 TABLET ORAL DAILY
Status: DISCONTINUED | OUTPATIENT
Start: 2019-07-14 | End: 2019-07-13

## 2019-07-13 RX ORDER — HYDRALAZINE HYDROCHLORIDE 20 MG/ML
10 INJECTION INTRAMUSCULAR; INTRAVENOUS
Status: DISCONTINUED | OUTPATIENT
Start: 2019-07-13 | End: 2019-07-14 | Stop reason: HOSPADM

## 2019-07-13 RX ADMIN — ACETAMINOPHEN 650 MG: 325 TABLET, FILM COATED ORAL at 21:14

## 2019-07-13 RX ADMIN — AMLODIPINE BESYLATE 5 MG: 5 TABLET ORAL at 13:27

## 2019-07-13 RX ADMIN — METOPROLOL SUCCINATE 25 MG: 50 TABLET, EXTENDED RELEASE ORAL at 08:37

## 2019-07-13 RX ADMIN — ROSUVASTATIN CALCIUM 40 MG: 20 TABLET, COATED ORAL at 21:14

## 2019-07-13 RX ADMIN — LISINOPRIL 20 MG: 20 TABLET ORAL at 08:37

## 2019-07-13 RX ADMIN — ASPIRIN 81 MG 81 MG: 81 TABLET ORAL at 08:37

## 2019-07-13 RX ADMIN — ACETAMINOPHEN 650 MG: 325 TABLET, FILM COATED ORAL at 06:13

## 2019-07-13 RX ADMIN — ACETAMINOPHEN 650 MG: 325 TABLET, FILM COATED ORAL at 10:27

## 2019-07-13 RX ADMIN — HYDRALAZINE HYDROCHLORIDE 10 MG: 20 INJECTION INTRAMUSCULAR; INTRAVENOUS at 16:22

## 2019-07-13 RX ADMIN — CLOPIDOGREL BISULFATE 75 MG: 75 TABLET ORAL at 08:37

## 2019-07-13 RX ADMIN — LISINOPRIL 20 MG: 20 TABLET ORAL at 17:20

## 2019-07-13 RX ADMIN — Medication 10 ML: at 21:14

## 2019-07-13 RX ADMIN — Medication 10 ML: at 05:20

## 2019-07-13 RX ADMIN — OXYCODONE HYDROCHLORIDE AND ACETAMINOPHEN 1 TABLET: 5; 325 TABLET ORAL at 00:04

## 2019-07-13 RX ADMIN — Medication 10 ML: at 13:28

## 2019-07-13 RX ADMIN — METOPROLOL SUCCINATE 25 MG: 50 TABLET, EXTENDED RELEASE ORAL at 17:20

## 2019-07-13 RX ADMIN — ACETAMINOPHEN 650 MG: 325 TABLET, FILM COATED ORAL at 17:39

## 2019-07-13 RX ADMIN — SODIUM CHLORIDE 75 ML/HR: 900 INJECTION, SOLUTION INTRAVENOUS at 10:10

## 2019-07-13 NOTE — PROGRESS NOTES
Bedside, Verbal and Written shift change report given to Ivania Amaya RN (oncoming nurse) by Melissa Blank RN (offgoing nurse). Report included the following information SBAR, Kardex, ED Summary, Intake/Output and Recent Results.

## 2019-07-13 NOTE — PROGRESS NOTES
Bedside and Verbal shift change report given to [address] [facility] Miguelito (oncoming nurse) by Ulisses Humphreys (offgoing nurse). Report included the following information SBAR, Kardex, Intake/Output, MAR, Recent Results, Cardiac Rhythm NSR and Alarm Parameters .

## 2019-07-13 NOTE — PROGRESS NOTES
Patient with ongoing HTN after receiving amlodipine. No CP noted. Discussed with Dr. Max Stock. Order received for PRN hydralazine. NTG gtt to be discontinued after administration of hydralazine.

## 2019-07-13 NOTE — PROGRESS NOTES
[facility] CARDIOLOGY PROGRESS NOTE    7/13/2019 1:18 PM    Admit Date: 7/11/2019    Admit Diagnosis: STEMI (ST elevation myocardial infarction) ([facility] 75.) [I21.3]      Subjective:   Stable overnight without angina, CHF, or palpitations. Vitals stable and controlled. No other complaints overnight. Tolerating meds well. Objective:      Vitals:    07/13/19 1215 07/13/19 1230 07/13/19 1246 07/13/19 1300   BP: 140/82 120/61 155/83 149/89   Pulse: 72 70 72 74   Resp: 23 18 26 17   Temp:       SpO2:       Weight:           Physical Exam:  Neck- supple, no JVD  CV- regular rate and rhythm no MRG  Lung- clear bilaterally  Abd- soft, nontender, nondistended  Ext- no edema  Skin- warm and dry    Data Review:   Recent Labs     07/12/19  0322 07/11/19  1825    143   K 4.2 4.2   MG 2.3  --    BUN 11 12   CREA 0.73 0.71   * 106*   WBC 10.5 8.3   HGB 12.1 13.6   HCT 39.0 44.5    198   INR  --  1.0   CHOL 179  --    TRIGL 45  --    HDL 69*  --        Assessment and Plan: Active Problems:    STEMI (ST elevation myocardial infarction) ([facility] (7/11/2019)- s/p PCI Diagonal- no angina overnight, to floor today, continue meds. Home tomorrow if continues to do well      Essential hypertension (7/11/2019)- higher, add norvasc today      Mixed hyperlipidemia (7/11/2019)- statin, continue with outpatient surveillance        JESSICA Sutherland MD  [address] Cardiology  Pager [phone]

## 2019-07-13 NOTE — PROGRESS NOTES
Bedside, Verbal and Written shift change report given to Hernan Chavez RN (oncoming nurse) by [facility], RN (offgoing nurse). Report included the following information SBAR, Kardex, ED Summary, Intake/Output and Recent Results.

## 2019-07-14 VITALS
TEMPERATURE: 98.2 F | OXYGEN SATURATION: 96 % | SYSTOLIC BLOOD PRESSURE: 158 MMHG | BODY MASS INDEX: 35.04 KG/M2 | DIASTOLIC BLOOD PRESSURE: 93 MMHG | RESPIRATION RATE: 36 BRPM | HEART RATE: 82 BPM | WEIGHT: 204.15 LBS

## 2019-07-14 LAB
ANION GAP SERPL CALC-SCNC: 8 MMOL/L (ref 7–16)
BUN SERPL-MCNC: 5 MG/DL (ref 8–23)
CALCIUM SERPL-MCNC: 9.2 MG/DL (ref 8.3–10.4)
CHLORIDE SERPL-SCNC: 109 MMOL/L (ref 98–107)
CO2 SERPL-SCNC: 27 MMOL/L (ref 21–32)
CREAT SERPL-MCNC: 0.56 MG/DL (ref 0.6–1)
ERYTHROCYTE [DISTWIDTH] IN BLOOD BY AUTOMATED COUNT: 14.7 % (ref 11.9–14.6)
GLUCOSE SERPL-MCNC: 90 MG/DL (ref 65–100)
HCT VFR BLD AUTO: 41.3 % (ref 35.8–46.3)
HGB BLD-MCNC: 12.7 G/DL (ref 11.7–15.4)
MAGNESIUM SERPL-MCNC: 2.2 MG/DL (ref 1.8–2.4)
MCH RBC QN AUTO: 27 PG (ref 26.1–32.9)
MCHC RBC AUTO-ENTMCNC: 30.8 G/DL (ref 31.4–35)
MCV RBC AUTO: 87.7 FL (ref 79.6–97.8)
NRBC # BLD: 0 K/UL (ref 0–0.2)
PLATELET # BLD AUTO: 152 K/UL (ref 150–450)
PMV BLD AUTO: 11.4 FL (ref 9.4–12.3)
POTASSIUM SERPL-SCNC: 3.5 MMOL/L (ref 3.5–5.1)
RBC # BLD AUTO: 4.71 M/UL (ref 4.05–5.2)
SODIUM SERPL-SCNC: 144 MMOL/L (ref 136–145)
WBC # BLD AUTO: 7.8 K/UL (ref 4.3–11.1)

## 2019-07-14 PROCEDURE — 74011250637 HC RX REV CODE- 250/637: Performed by: INTERNAL MEDICINE

## 2019-07-14 PROCEDURE — 85027 COMPLETE CBC AUTOMATED: CPT

## 2019-07-14 PROCEDURE — 80048 BASIC METABOLIC PNL TOTAL CA: CPT

## 2019-07-14 PROCEDURE — 36415 COLL VENOUS BLD VENIPUNCTURE: CPT

## 2019-07-14 PROCEDURE — 83735 ASSAY OF MAGNESIUM: CPT

## 2019-07-14 RX ORDER — ROSUVASTATIN CALCIUM 40 MG/1
40 TABLET, COATED ORAL
Qty: 30 TAB | Refills: 11 | Status: SHIPPED | OUTPATIENT
Start: 2019-07-14

## 2019-07-14 RX ORDER — CLOPIDOGREL BISULFATE 75 MG/1
75 TABLET ORAL DAILY
Qty: 30 TAB | Refills: 11 | Status: SHIPPED | OUTPATIENT
Start: 2019-07-14

## 2019-07-14 RX ORDER — NITROGLYCERIN 0.4 MG/1
0.4 TABLET SUBLINGUAL
Qty: 25 TAB | Refills: 11 | Status: SHIPPED | OUTPATIENT
Start: 2019-07-14

## 2019-07-14 RX ORDER — METOPROLOL SUCCINATE 50 MG/1
50 TABLET, EXTENDED RELEASE ORAL DAILY
Status: DISCONTINUED | OUTPATIENT
Start: 2019-07-14 | End: 2019-07-14 | Stop reason: HOSPADM

## 2019-07-14 RX ORDER — AMLODIPINE BESYLATE 5 MG/1
5 TABLET ORAL DAILY
Qty: 30 TAB | Refills: 6 | Status: SHIPPED | OUTPATIENT
Start: 2019-07-14

## 2019-07-14 RX ORDER — LISINOPRIL 40 MG/1
40 TABLET ORAL DAILY
Qty: 30 TAB | Refills: 6 | Status: SHIPPED | OUTPATIENT
Start: 2019-07-14 | End: 2019-08-16 | Stop reason: SINTOL

## 2019-07-14 RX ORDER — GUAIFENESIN 100 MG/5ML
81 LIQUID (ML) ORAL DAILY
Status: SHIPPED | COMMUNITY
Start: 2019-07-14

## 2019-07-14 RX ORDER — LISINOPRIL 20 MG/1
40 TABLET ORAL DAILY
Status: DISCONTINUED | OUTPATIENT
Start: 2019-07-14 | End: 2019-07-14 | Stop reason: HOSPADM

## 2019-07-14 RX ADMIN — METOPROLOL SUCCINATE 50 MG: 50 TABLET, EXTENDED RELEASE ORAL at 08:20

## 2019-07-14 RX ADMIN — AMLODIPINE BESYLATE 5 MG: 5 TABLET ORAL at 08:20

## 2019-07-14 RX ADMIN — Medication 10 ML: at 06:00

## 2019-07-14 RX ADMIN — CLOPIDOGREL BISULFATE 75 MG: 75 TABLET ORAL at 08:20

## 2019-07-14 RX ADMIN — LISINOPRIL 40 MG: 20 TABLET ORAL at 08:20

## 2019-07-14 RX ADMIN — ASPIRIN 81 MG 81 MG: 81 TABLET ORAL at 08:20

## 2019-07-14 NOTE — PROGRESS NOTES
Bedside report received from Marlena [facility]. VSS, denies pain or shortness of breath at this time. Still c/o mild headache, MD aware, patient refusing prn pain medications at this time. R radial site soft, no bleeding noted. Some bruising marked around site with slight tenderness to touch. No issues.

## 2019-07-14 NOTE — PROGRESS NOTES
[facility] CARDIOLOGY PROGRESS NOTE    7/14/2019 7:17 AM    Admit Date: 7/11/2019    Admit Diagnosis: STEMI (ST elevation myocardial infarction) ([facility] 75.) [I21.3]      Subjective:   Stable overnight without angina, CHF, or palpitations. Vitals stable and controlled. No other complaints overnight. Tolerating meds well. Objective:      Vitals:    07/14/19 0300 07/14/19 0400 07/14/19 0500 07/14/19 0700   BP: (!) 152/99 (!) 157/91 143/77 (!) 161/98   Pulse: 68 68 73 73   Resp: 17 18 18 (!) 36   Temp:  98.7 °F (37.1 °C)     SpO2:  96%     Weight:           Physical Exam:  Neck- supple, no JVD  CV- regular rate and rhythm no MRG  Lung- clear bilaterally  Abd- soft, nontender, nondistended  Ext- no edema, right wrist CDI  Skin- warm and dry    Data Review:   Recent Labs     07/14/19  0317 07/12/19  0322 07/11/19  1825    142 143   K 3.5 4.2 4.2   MG 2.2 2.3  --    BUN 5* 11 12   CREA 0.56* 0.73 0.71   GLU 90 132* 106*   WBC 7.8 10.5 8.3   HGB 12.7 12.1 13.6   HCT 41.3 39.0 44.5    166 198   INR  --   --  1.0   CHOL  --  179  --    TRIGL  --  45  --    HDL  --  69*  --        Assessment and Plan:        Active Problems:    STEMI (ST elevation myocardial infarction) ([facility] 75[facility] (7/11/2019)- s/p POBA to tortuous small caliber Diagonal- no angina x 2 days, to floor today, continue meds. Home tomorrow if continues to do well       Essential hypertension (7/11/2019)- see below       Mixed hyperlipidemia (7/11/2019)- statin, continue with outpatient surveillance    HOME TODAY  TC 7 FOLLOW UP WITH DR. Enma Schrader ON ASA, PLAVIX, STATIN AS CURRENTLY TAKING  CHANGE TOPROL XL TO 50MG DAILY  CHANGE LISINOPRIL TO 40MG DAILY  ADDED 5MG NORVASC  SL NTG PRN  DAILY BP AND HR LOG AT HOME UNTIL FOLLOW UP WITH NESSMITH, ADD LONG ACTING NITRATES AS ABOVE FOR NOW        JESSICA Zapata MD  [facility] Cardiology  Pager [phone]

## 2019-07-14 NOTE — DISCHARGE INSTRUCTIONS
Patient Education       DISCHARGE SUMMARY from Nurse    PATIENT INSTRUCTIONS:    After general anesthesia or intravenous sedation, for 24 hours or while taking prescription Narcotics:  · Limit your activities  · Do not drive and operate hazardous machinery  · Do not make important personal or business decisions  · Do  not drink alcoholic beverages  · If you have not urinated within 8 hours after discharge, please contact your surgeon on call. Report the following to your surgeon:  · Excessive pain, swelling, redness or odor of or around the surgical area  · Temperature over 100.5  · Nausea and vomiting lasting longer than 4 hours or if unable to take medications  · Any signs of decreased circulation or nerve impairment to extremity: change in color, persistent  numbness, tingling, coldness or increase pain  · Any questions    What to do at Home:  Recommended activity: Activity as tolerated and No lifting, Driving, or Strenuous exercise for 3-5 days, no soaking in tub for 7 days. If you experience any of the following symptoms redness/bleeding/drainage at cath site (R wrist); chest pain or shortness of breath unrelieved with nitroglycerin, please follow up with [facility]. *  Please give a list of your current medications to your Primary Care Provider. *  Please update this list whenever your medications are discontinued, doses are      changed, or new medications (including over-the-counter products) are added. *  Please carry medication information at all times in case of emergency situations. These are general instructions for a healthy lifestyle:    No smoking/ No tobacco products/ Avoid exposure to second hand smoke  Surgeon General's Warning:  Quitting smoking now greatly reduces serious risk to your health.     Obesity, smoking, and sedentary lifestyle greatly increases your risk for illness    A healthy diet, regular physical exercise & weight monitoring are important for maintaining a healthy lifestyle    You may be retaining fluid if you have a history of heart failure or if you experience any of the following symptoms:  Weight gain of 3 pounds or more overnight or 5 pounds in a week, increased swelling in our hands or feet or shortness of breath while lying flat in bed. Please call your doctor as soon as you notice any of these symptoms; do not wait until your next office visit. The discharge information has been reviewed with the patient. The patient verbalized understanding. Discharge medications reviewed with the patient and appropriate educational materials and side effects teaching were provided. ___________________________________________________________________________________________________________________________________  Heart Attack: Care Instructions  Your Care Instructions    A heart attack (myocardial infarction, or MI) occurs when one or more of the coronary arteries, which supply the heart with oxygen-rich blood, is blocked. A blockage usually occurs when plaque inside the artery breaks open and a blood clot forms in the artery. After a heart attack, you may be worried about your future. Over the next several weeks, your heart will start to heal. Though it can be hard to break old habits, you can prevent another heart attack by making some lifestyle changes and by taking medicines. You may use this information for ideas about what to do at home to speed your recovery. Follow-up care is a key part of your treatment and safety. Be sure to make and go to all appointments, and call your doctor if you are having problems. It's also a good idea to know your test results and keep a list of the medicines you take. How can you care for yourself at home? Activity    · Until your doctor says it is okay, do not do strenuous exercise. And do not lift, pull, or push anything heavy.  Ask your doctor what types of activities are safe for you.     · If your doctor has not set you up with a cardiac rehabilitation (rehab) program, talk to him or her about whether that is right for you. Cardiac rehab includes supervised exercise. It also includes help with diet and lifestyle changes and emotional support. It may reduce your risk of future heart problems.     · Increase your activities slowly. Take short rest breaks when you get tired.     · If your doctor recommends it, get more exercise. Walking is a good choice. Bit by bit, increase the amount you walk every day. Try for at least 30 minutes on most days of the week. You also may want to swim, bike, or do other activities. Talk with your doctor before you start an exercise program to make sure it is safe for you.     · Ask your doctor when you can drive, go back to work, and do other daily activities again.     · You can have sex as soon as you feel ready for it. Often this means when you can easily walk around or climb stairs. Talk with your doctor if you have any concerns. If you are taking nitroglycerin, do not take erection-enhancing medicine such as sildenafil (Viagra), tadalafil (Cialis), or vardenafil (Levitra) .    Lifestyle changes    · Do not smoke. Smoking increases your risk of another heart attack. If you need help quitting, talk to your doctor about stop-smoking programs and medicines. These can increase your chances of quitting for good.     · Eat a heart-healthy diet that is low in saturated fat and salt, and is full of fruits, vegetables and whole-grains. Eat at least two servings of fish each week. You may get more details about how to eat healthy. But these tips can help you get started.     · Stay at a healthy weight, or lose weight if you need to. Medicines    · Be safe with medicines. Take your medicines exactly as prescribed. Call your doctor if you think you are having a problem with your medicine. You will get more details on the specific medicines your doctor prescribes.  Do not stop taking your medicine unless your doctor tells you to. Not taking your medicine might raise your risk of having another heart attack.     · You may need several medicines to help lower your risk of another heart attack. These include:  ? Blood pressure medicines such as angiotensin-converting enzyme (ACE) inhibitors, ARBs (angiotensin II receptor blockers), and beta-blockers. ? Cholesterol medicine called statins. ? Aspirin and other blood thinners. These prevent blood clots that can cause a heart attack.     · If your doctor has given you nitroglycerin, keep it with you at all times. If you have angina symptoms, such as chest pain or pressure, sit down and rest. Take the first dose of nitroglycerin as directed. If symptoms get worse or are not getting better within 5 minutes, call 911 right away. Stay on the phone. The emergency  will tell you what to do.     · Do not take any over-the-counter medicines, vitamins, or herbal products without talking to your doctor first.    Staying healthy    · Manage other health conditions such as high blood pressure and diabetes.     · Avoid colds and flu. Get a pneumococcal vaccine shot. If you have had one before, ask your doctor whether you need another dose. Get the flu vaccine every year. If you must be around people with colds or flu, wash your hands often.     · Be sure to tell your doctor about any angina symptoms you have had, even if they went away. Pay attention to your angina symptoms. Know what is typical for you and learn how to control it. Know when to call for help.     · Talk to your family, friends, or a counselor about your feelings. It is normal to feel frightened, angry, hopeless, helpless, and even guilty. Talking openly about bad feelings can help you cope. If you have symptoms of depression, talk to your doctor. When should you call for help? Call 911 anytime you think you may need emergency care. For example, call if:    · You have symptoms of a heart attack.  These may include:  ? Chest pain or pressure, or a strange feeling in the chest.  ? Sweating. ? Shortness of breath. ? Nausea or vomiting. ? Pain, pressure, or a strange feeling in the back, neck, jaw, or upper belly or in one or both shoulders or arms. ? Lightheadedness or sudden weakness. ? A fast or irregular heartbeat. After you call 911, the  may tell you to chew 1 adult-strength or 2 to 4 low-dose aspirin. Wait for an ambulance. Do not try to drive yourself.     · You have angina symptoms (such as chest pain or pressure) that do not go away with rest or are not getting better within 5 minutes after you take a dose of nitroglycerin.     · You passed out (lost consciousness).     · You feel like you are having another heart attack.    Call your doctor now or seek immediate medical care if:    · You are having angina symptoms, such as chest pain or pressure, more often than usual, or the symptoms are different or worse than usual.     · You have new or increased shortness of breath.     · You are dizzy or lightheaded, or you feel like you may faint.    Watch closely for changes in your health, and be sure to contact your doctor if you have any problems. Where can you learn more? Go to http://stefanie-martha.info/. Enter [id number] in the search box to learn more about \"Heart Attack: Care Instructions. \"  Current as of: July 22, 2018  Content Version: 11.9  © 3763-2627 [employer]. Care instructions adapted under license by [employer] (which disclaims liability or warranty for this information). If you have questions about a medical condition or this instruction, always ask your healthcare professional. [place] [address] any warranty or liability for your use of this information. Patient Education        Learning About ACE Inhibitors  Introduction    ACE (angiotensin-converting enzyme) inhibitors stop the release of an enzyme.  This enzyme makes your blood vessels smaller. Without it, your blood vessels relax and get bigger. This lowers your blood pressure. These medicines also increase how much water and salt go into your urine. This also lowers blood pressure. You may take this kind of medicine if you have high blood pressure. Or you may take it if you have heart problems, kidney problems, or diabetes. If you have coronary artery disease, this medicine can help prevent heart attacks and strokes. Examples  · Benazepril (Lotensin)  · Lisinopril (Prinivil, Zestril)  · Ramipril (Altace)  This is not a complete list.  Possible side effects  Side effects may include:  · A cough. · Low blood pressure. This can make you feel dizzy or weak. · Too much potassium in your body. · Swelling. This may be a sign of an allergic reaction. You may have other side effects or reactions not listed here. Check the information that comes with your medicine. What to know about taking this medicine  · ACE inhibitors can cause a dry cough. Talk to your doctor if you have a dry cough. You may need a different medicine. · These medicines can cause an allergic reaction. This can cause a little swelling. Or it can cause red bumps on your skin that hurt. In rare cases, the swelling may make it hard for you to breathe. · Do not take this medicine if you are pregnant. And don't take it if you plan to become pregnant. · Take your medicines exactly as prescribed. Call your doctor if you think you are having a problem with your medicine. · Check with your doctor or pharmacist before you use any other medicines. This includes over-the-counter medicines. Make sure your doctor knows all of the medicines, vitamins, herbal products, and supplements you take. Taking some medicines together can cause problems. · You may need regular blood tests. Where can you learn more? Go to http://stefanie-martha.info/.   Enter P050 in the search box to learn more about \"Learning About ACE Inhibitors. \"  Current as of: July 22, 2018  Content Version: 11.9  © 0525-2638 [employer]. Care instructions adapted under license by [employer] (which disclaims liability or warranty for this information). If you have questions about a medical condition or this instruction, always ask your healthcare professional. [address] any warranty or liability for your use of this information. Patient Education        Taking Aspirin and Other Antiplatelets Safely: Care Instructions  Your Care Instructions    Aspirin and other antiplatelet medicines help prevent blood clots from forming. They can help some people lower their risk of a heart attack or stroke. But these medicines can also make you more likely to bleed. That's why it's important to learn how to take them safely. If you take aspirin, be sure you know how to take it. Your doctor can tell you what dose to take and how often to take it. One low-dose aspirin is 81 milligrams (mg). But the dose for daily aspirin can range from 81 mg to 325 mg. If you take another antiplatelet, take it as prescribed. Follow-up care is a key part of your treatment and safety. Be sure to make and go to all appointments, and call your doctor if you are having problems. It's also a good idea to know your test results and keep a list of the medicines you take. How can you care for yourself at home? · Before you start to take daily aspirin or some other antiplatelet, tell your doctor all the medicines, vitamins, herbal products, and supplements you take. · Tell your doctors, dentist, and pharmacist that you take an antiplatelet. · Take your medicine as your doctor directs. Make sure that you understand exactly what your doctor wants you to do. If another doctor says to stop taking the medicine for any reason, talk to the doctor who prescribed it before you stop. · Take your medicine at the same time every day.   · Do not chew or crush the coated or time-release forms of your medicine. · If you miss a dose, don't take an extra dose to make up for it. · Ask your doctor whether you can drink alcohol. And ask how much you can drink. When you take an antiplatelet, drinking too much raises your risk for liver damage and stomach bleeding. · If you are pregnant, are breastfeeding, or plan to become pregnant, talk to your doctor about what medicines are safe. · Talk with your doctor before you take a pain medicine. Many pain medicines have aspirin. Too much aspirin can be harmful. · Wear medical alert jewelry. This lets others know that you take an antiplatelet. You can buy it at most drugstores. · Try to avoid injuries that might make you bleed. For example, be careful when you exercise and when you play sports. Make your home safe to reduce your risk of falling. When should you call for help? [id number] anytime you think you may need emergency care. For example, call if:    · You have a sudden, severe headache that is different from past headaches.    Call your doctor now or seek immediate medical care if:    · You have any abnormal bleeding, such as:  ? A nosebleed that you can't easily stop. ? Bloody or black stools, or rectal bleeding. ? Bloody or pink urine.     · You feel dizzy or lightheaded or feel like you may faint.    Watch closely for changes in your health, and be sure to contact your doctor if you have any problems. Where can you learn more? Go to http://stefanie-martha.info/. Enter F502 in the search box to learn more about \"Taking Aspirin and Other Antiplatelets Safely: Care Instructions. \"  Current as of: July 22, 2018  Content Version: 11.9  © 2480-8533 [employer]. Care instructions adapted under license by [employer] (which disclaims liability or warranty for this information).  If you have questions about a medical condition or this instruction, always ask your healthcare professional. [address] any warranty or liability for your use of this information. Patient Education        Cardiac Rehabilitation: Care Instructions  Your Care Instructions    Cardiac rehabilitation is a program for people who have a heart problem, such as a heart attack, heart failure, or a heart valve disease. The program includes exercise, lifestyle changes, education, and emotional support. Cardiac rehab can help you improve the quality of your life through better overall health. It can help you lose weight and feel better about yourself. On your cardiac rehab team, you may have your doctor, a nurse specialist, a dietitian, and a physical therapist. They will design your cardiac rehab program specifically for you. You will learn how to reduce your risk for heart problems, how to manage stress, and how to eat a heart-healthy diet. By the end of the program, you will be ready to maintain a healthier lifestyle on your own. Follow-up care is a key part of your treatment and safety. Be sure to make and go to all appointments, and call your doctor if you are having problems. It's also a good idea to know your test results and keep a list of the medicines you take. How can you care for yourself at home? · Take your medicines exactly as prescribed. Call your doctor if you think you are having a problem with your medicine. You will get more details on the specific medicines your doctor prescribes. · Weigh yourself every day if your doctor tells you to. Watch for sudden weight gain. Weigh yourself on the same scale with the same amount of clothing at the same time of day. · Plan your meals so that you are eating heart-healthy foods. ? Eat a variety of foods daily. Fresh fruits and vegetables and whole-grains are good choices. ? Limit your fat intake, especially saturated and trans fat. ? Limit salt (sodium). ? Increase fiber in your diet. ? Limit alcohol.   · Learn how to take your pulse so that you can track your heart rate during exercise. · Always check with your doctor before you begin a new exercise program.  · Warm up before you exercise and cool down afterward for at least 15 minutes each. This will help your heart gradually prepare for and recover from exercise and avoid pushing your heart too hard. · Stop exercising if you have any unusual discomfort, such as chest pain. · Do not smoke. Smoking can make heart problems worse. If you need help quitting, talk to your doctor about stop-smoking programs and medicines. These can increase your chances of quitting for good. When should you call for help? Call 911 anytime you think you may need emergency care. For example, call if:    · You have severe trouble breathing.     · You cough up pink, foamy mucus and you have trouble breathing.     · You have symptoms of a heart attack. These may include:  ? Chest pain or pressure, or a strange feeling in the chest.  ? Sweating. ? Shortness of breath. ? Nausea or vomiting. ? Pain, pressure, or a strange feeling in the back, neck, jaw, or upper belly or in one or both shoulders or arms. ? Lightheadedness or sudden weakness. ? A fast or irregular heartbeat. After you call 911, the  may tell you to chew 1 adult-strength or 2 to 4 low-dose aspirin. Wait for an ambulance. Do not try to drive yourself.     · You have angina symptoms (such as chest pain or pressure) that do not go away with rest or are not getting better within 5 minutes after you take a dose of nitroglycerin.     · You have symptoms of a stroke. These may include:  ? Sudden numbness, tingling, weakness, or loss of movement in your face, arm, or leg, especially on only one side of your body. ? Sudden vision changes. ? Sudden trouble speaking. ? Sudden confusion or trouble understanding simple statements. ? Sudden problems with walking or balance.   ? A sudden, severe headache that is different from past headaches.     · You passed out (lost consciousness).    Call your doctor now or seek immediate medical care if:    · You have new or increased shortness of breath.     · You are dizzy or lightheaded, or you feel like you may faint.     · You gain weight suddenly, such as more than 2 to 3 pounds in a day or 5 pounds in a week. (Your doctor may suggest a different range of weight gain.)     · You have increased swelling in your legs, ankles, or feet.    Watch closely for changes in your health, and be sure to contact your doctor if you have any problems. Where can you learn more? Go to http://stefanie-martha.info/. Enter N032 in the search box to learn more about \"Cardiac Rehabilitation: Care Instructions. \"  Current as of: July 22, 2018  Content Version: 11.9  © 2597-2460 [employer]. Care instructions adapted under license by [employer] (which disclaims liability or warranty for this information). If you have questions about a medical condition or this instruction, always ask your healthcare professional. [address] any warranty or liability for your use of this information.

## 2019-07-14 NOTE — PROGRESS NOTES
Pt is for discharge home today with no needs/supportive care orders recieved for CM at this time. Pt will follow up with Cardiology and Cardiac Rehad.   Care Management Interventions  PCP Verified by CM: (Dr Viky Crockett)  Mode of Transport at Discharge: (family)  Transition of Care Consult (CM Consult): Discharge Planning(Pt is insured with pharmacy benefits.  )  Discharge Durable Medical Equipment: No  Physical Therapy Consult: No  Occupational Therapy Consult: No  Speech Therapy Consult: No  Current Support Network: Lives with Spouse, Family Lives Nearby(Pt normally manages her ADL's.)  Confirm Follow Up Transport: Family  Plan discussed with Pt/Family/Caregiver: Yes  Freedom of Choice Offered: Yes   Resource Information Provided?: No  Discharge Location  Discharge Placement: Home

## 2019-07-14 NOTE — DISCHARGE SUMMARY
[facility] Cardiology Discharge Summary     Patient ID:  Lila Rodriguez  [id number]  18 y.o.  1955    Admit date: 7/11/2019    Discharge date:  7/14/2019    Admitting Physician: Beatriz Hedrick MD     Discharge Physician: Dr. Yunior Parikh    Admission Diagnoses: STEMI (ST elevation myocardial infarction) [facility]) [I21.3]    Discharge Diagnoses:    Diagnosis    STEMI (ST elevation myocardial infarction)     Essential hypertension    Mixed hyperlipidemia       Cardiology Procedures this admission:  Left heart catheterization with PCI  Consults: None    Hospital Course: Patient is a 61-year-old female who presented to [facility] with 45 minutes of substernal chest discomfort described as a pressure w/o radiation and mild associated dyspnea. ECG showed lateral ST elevation and she was transferred emergently to [facility] for cardiac catheterization via STEMI protocol. She was taken to the cath lab by Dr. Tr Daniel which showed abrupt occlusion of the mid to distal first diagonal artery. The remaining coronary arteries showed minimal disease. She underwent angioplasty with a 2.0 mm balloon with restoration of flow but continued distal vessel occlusion. It was unclear whether this represented a spontaneous coronary artery dissection or an embolic event. LV gram showed EF 45-50% with anterolateral hypokinesis. Pt was kept on Aggrastat for 18 hours. D-dimer and LE duplex were negative. Monitor as an out patient for AF recommended. Patient tolerated the procedure well and was taken to the CCU for recovery. The following morning patient was up feeling well without any complaints of chest pain or shortness of breath. Echo showed EF 60-65%, no regional wall motion abnormalities, Left atrium was mildly dilated and no right-to-left shunt. Patient's right radial cath site was clean, dry and intact without hematoma or bruit. Patient's labs were stable. She continued to progress well.  Her HTN meds were titrated. On the AM of discharge, she was seen by Dr. Olive Munoz and determined stable and ready for discharge. Patient was instructed on the importance of medication compliance including taking Aspirin and Plavix everyday without missing a dose. For maximized medical therapy for CAD, patient will continue BB, ACE-I, and statin. The patient will follow up with [facility] for 14 day E patch monitor, a TC 7 appt and has been referred to cardiac rehab. DISPOSITION: The patient is being discharged home in stable condition on a low saturated fat, low cholesterol and low salt diet. The patient is instructed to advance activities as tolerated to the limit of fatigue or shortness of breath. The patient is instructed to avoid all heavy lifting, straining, stooping or squatting for 3-5 days. The patient is instructed to watch the cath site for bleeding/oozing; if seen, the patient is instructed to apply firm pressure with a clean cloth and call [facility] at [phone]. The patient is instructed to watch for signs of infection which include: increasing area of redness, fever/hot to touch or purulent drainage at the catheterization site. The patient is instructed not to soak in a bathtub for 7-10 days, but is cleared to shower. The patient is instructed to call the office or return to the ER for immediate evaluation for any shortness of breath or chest pain not relieved by NTG. Discharge Exam:   Visit Vitals  /77   Pulse 73   Temp 98.7 °F (37.1 °C)   Resp 18   Wt 92.6 kg (204 lb 2.3 oz)   SpO2 96%   BMI 35.04 kg/m²       Patient has been seen by Dr. Schaefer Friend: see his progress note for exam details.     Recent Results (from the past 24 hour(s))   METABOLIC PANEL, BASIC    Collection Time: 07/14/19  3:17 AM   Result Value Ref Range    Sodium 144 136 - 145 mmol/L    Potassium 3.5 3.5 - 5.1 mmol/L    Chloride 109 (H) 98 - 107 mmol/L    CO2 27 21 - 32 mmol/L    Anion gap 8 7 - 16 mmol/L    Glucose 90 65 - 100 mg/dL    BUN 5 (L) 8 - 23 MG/DL    Creatinine 0.56 (L) 0.6 - 1.0 MG/DL    GFR est AA >60 >60 ml/min/1.73m2    GFR est non-AA >60 >60 ml/min/1.73m2    Calcium 9.2 8.3 - 10.4 MG/DL   CBC W/O DIFF    Collection Time: 07/14/19  3:17 AM   Result Value Ref Range    WBC 7.8 4.3 - 11.1 K/uL    RBC 4.71 4.05 - 5.2 M/uL    HGB 12.7 11.7 - 15.4 g/dL    HCT 41.3 35.8 - 46.3 %    MCV 87.7 79.6 - 97.8 FL    MCH 27.0 26.1 - 32.9 PG    MCHC 30.8 (L) 31.4 - 35.0 g/dL    RDW 14.7 (H) 11.9 - 14.6 %    PLATELET 012 600 - 476 K/uL    MPV 11.4 9.4 - 12.3 FL    ABSOLUTE NRBC 0.00 0.0 - 0.2 K/uL   MAGNESIUM    Collection Time: 07/14/19  3:17 AM   Result Value Ref Range    Magnesium 2.2 1.8 - 2.4 mg/dL         Patient Instructions:   Current Discharge Medication List      START taking these medications    Details   rosuvastatin (CRESTOR) 40 mg tablet Take 1 Tab by mouth nightly. Qty: 30 Tab, Refills: 11      lisinopril (PRINIVIL, ZESTRIL) 40 mg tablet Take 1 Tab by mouth daily. Qty: 30 Tab, Refills: 6      amLODIPine (NORVASC) 5 mg tablet Take 1 Tab by mouth daily. Qty: 30 Tab, Refills: 6      aspirin 81 mg chewable tablet Take 1 Tab by mouth daily. clopidogrel (PLAVIX) 75 mg tab Take 1 Tab by mouth daily. Qty: 30 Tab, Refills: 11      nitroglycerin (NITROSTAT) 0.4 mg SL tablet 1 Tab by SubLINGual route every five (5) minutes as needed for Chest Pain. Up to 3 doses. Qty: 25 Tab, Refills: 11         CONTINUE these medications which have NOT CHANGED    Details   metoprolol succinate (TOPROL-XL) 50 mg XL tablet Take  by mouth daily.          STOP taking these medications       nebivolol (BYSTOLIC) 10 mg tablet Comments:   Reason for Stopping:             Amie Quiles PA-C

## 2019-07-14 NOTE — PROGRESS NOTES
Pt has been alert and oriented with no pain other than a slight headache. Ambulates to bathroom by herself. No issues. VSS.

## 2019-07-14 NOTE — PROGRESS NOTES
Discharge instructions and new medications discussed with patient and spouse who verbalizes understanding. Patient discharged by wheelchair with RN. no

## 2019-07-18 PROBLEM — I10 ESSENTIAL HYPERTENSION WITH GOAL BLOOD PRESSURE LESS THAN 130/85: Status: ACTIVE | Noted: 2019-07-18

## 2019-07-18 PROBLEM — Z98.61 HISTORY OF PTCA: Status: ACTIVE | Noted: 2019-07-18

## 2019-07-23 ENCOUNTER — HOSPITAL ENCOUNTER (OUTPATIENT)
Dept: CARDIAC REHAB | Age: 64
Discharge: HOME | End: 2019-07-23

## 2019-07-23 RX ORDER — CELECOXIB 200 MG/1
200 CAPSULE ORAL 2 TIMES DAILY
COMMUNITY

## 2019-07-23 NOTE — CARDIO/PULMONARY
Dear Dr. Belén Monique:    Thank you for referring your patient, Krish Nguyen (: 1955), to the Cardiopulmonary Rehabilitation Program at [facility].  Mrs. Radha Ortega is a good candidate for the Cardiac Rehab Program and should see improvements with regular participation. We will be addressing appropriate interventions for modifiable risk factors with your patient during the next 12 weeks. We will contact you with any issues or concerns that may arise, or you can follow your patients progress through [facility] at any time. A final summary will be available on [facility] when the program is completed. Again, thank you for your referral. If we can be of further assistance, please feel free to contact the Cardiopulmonary Rehab staff at [phone].     Sincerely,    Peg Habermann, BSN, RN  Cardiopulmonary Rehabilitation Nurse  [facility]    '

## 2019-08-01 ENCOUNTER — HOSPITAL ENCOUNTER (OUTPATIENT)
Dept: CARDIAC REHAB | Age: 64
Discharge: HOME | End: 2019-08-01
Payer: OTHER GOVERNMENT

## 2019-08-01 VITALS — WEIGHT: 200 LBS | BODY MASS INDEX: 34.15 KG/M2 | HEIGHT: 64 IN

## 2019-08-01 PROCEDURE — 93798 PHYS/QHP OP CAR RHAB W/ECG: CPT

## 2019-08-02 ENCOUNTER — HOSPITAL ENCOUNTER (OUTPATIENT)
Dept: CARDIAC REHAB | Age: 64
Discharge: HOME | End: 2019-08-02
Payer: OTHER GOVERNMENT

## 2019-08-02 PROCEDURE — 93798 PHYS/QHP OP CAR RHAB W/ECG: CPT

## 2019-08-05 ENCOUNTER — HOSPITAL ENCOUNTER (OUTPATIENT)
Dept: CARDIAC REHAB | Age: 64
Discharge: HOME | End: 2019-08-05
Payer: OTHER GOVERNMENT

## 2019-08-05 PROCEDURE — 93798 PHYS/QHP OP CAR RHAB W/ECG: CPT

## 2019-08-07 ENCOUNTER — HOSPITAL ENCOUNTER (OUTPATIENT)
Dept: CARDIAC REHAB | Age: 64
Discharge: HOME | End: 2019-08-07
Payer: OTHER GOVERNMENT

## 2019-08-07 VITALS — WEIGHT: 199.8 LBS | BODY MASS INDEX: 34.3 KG/M2

## 2019-08-07 PROCEDURE — 93798 PHYS/QHP OP CAR RHAB W/ECG: CPT

## 2019-08-09 ENCOUNTER — HOSPITAL ENCOUNTER (OUTPATIENT)
Dept: CARDIAC REHAB | Age: 64
Discharge: HOME | End: 2019-08-09
Payer: OTHER GOVERNMENT

## 2019-08-09 PROCEDURE — 93798 PHYS/QHP OP CAR RHAB W/ECG: CPT

## 2019-08-09 NOTE — PROGRESS NOTES
60year old female s/p PCI on 7/11/19 enrolled in cardiac rehabilitation, seen today for initial nutrition counseling. States good energy level and no problems with appetite today. Stated Nutrition Goals: learn about portions sizes and sodium  Medical History: sleep apnea, HTN, CAD. Nutrition related medications/supplements: statin, plavix, betablocker. Denies taking nutritional supplements at this time     Nutrition Related Labs: Mg wnl, lipids:  (H), HDL 69 (H)    Social History: Lives with and is supported by spouse. 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 Physical Activity: Rehabilitation 3x per week, home exercise includes walking her dog. Food and Nutrition Intake History:   Tells me that she is from [facility] and prior to this STEMI she followed a traditional southern meal pattern. She has since been looking at improving her diet quality, including reducing added sugars, sodium and decreasing portions to reduce weight and prevent another heart attack. Eats 2 meals (breakfast and supper), and snacks for between. Eats out 1-2x per week. Goal to eat out 1x per week. Stated 1 day diet recall includes   Breakfast: Pancakes  Lunch - skipped  Snack: nuts, and other snacks  Dinner: chicken and vegetables. PM Snack: popcorn  Beverages: 24 ounces water,   Alcohol use: 1xper week, glass of wine. GI Hx: /Digestive Issues: Allergy to [place]. Anthropometric Data:  BMI: 34.3 kg/m² ; BMI class obese for age. Height: 5' 4\" (1.626 m). Weight: 90.6 kg (199 lb 12.8 oz). - stated goal weight = 150 pounds    Waist measurement (inches): 41 (increased risk for metabolic issues)    Estimated Nutritional Needs:  Calories: MSJ x 1.3 (sedentary) 1885kcal /day: MSJ x  (-200kcal) for safe weight loss: 1600kcal/day   Protein: 80 (20% of estimated energy low end needs)  CHO: 200g/day (50% of estimated low end energy needs)  Stage of Behavior Change: Preparation   Nutrition Diagnosis:    Imbalanced intake of nutrients related to food choices/ food related knowledge evidenced by food recall; BMI > 25, and waist circumference > 35 and LDL > 100mg/dL    Nutrition Intervention:  1. Nutrition Education: Educated patient and spouse on cardioprotective meal pattern/Mediterranean meal pattern including   Guidelines for saturated fat (<7% total calories), sodium ( < 2000mg/day or follow MD recommendations), and added sugars ( < 25 grams for women/<35 grams for men) and high sources of each reviewed   Consumption of daily fiber intake with emphasis on 7-13 grams of soluble fiber daily and food sources reviewed.  Demonstrated portions sizes to support weight loss goals   Demonstrated food label reading and food marielos ([employer]) for home use to support self efficacy goals.  Sodium education; \"salty six\" foods, food label reading, low sodium food sources and meal planning. 2. Reviewed LDL lab/body comp results/goals, and nutrition modifications that will support improvements in body composition and lab values. Handouts provided for home use:    Heart healthy eating pattern with 1 day sample menu.  Heart healthy recipes, recipe modification tips.  [employer] plate method   Tips for reducing sodium   Weight loss tips    Nutrition Goals:    5-10% weight loss by increasing f/v to 5 servings per day, and decreasing portion sizes at meals, decrease mindless snacking, by end of cardiac/ pulmonary rehabilitation. Monitoring/Evaluation: RD to follow up with participant during rehab sessions for questions and assessment of progression toward goals. Compliance: Pt agreeable to use food marielos and start reducing portions and added sugars.    Barriers: None identified at this time     Devon Ga, MS, RD, LD  Cardiac/Pulmonary Rehab Dietitian

## 2019-08-12 ENCOUNTER — HOSPITAL ENCOUNTER (OUTPATIENT)
Dept: CARDIAC REHAB | Age: 64
Discharge: HOME | End: 2019-08-12
Payer: OTHER GOVERNMENT

## 2019-08-12 PROCEDURE — 93798 PHYS/QHP OP CAR RHAB W/ECG: CPT

## 2019-08-14 ENCOUNTER — HOSPITAL ENCOUNTER (OUTPATIENT)
Dept: CARDIAC REHAB | Age: 64
Discharge: HOME | End: 2019-08-14
Payer: OTHER GOVERNMENT

## 2019-08-14 VITALS — BODY MASS INDEX: 34.06 KG/M2 | WEIGHT: 198.4 LBS

## 2019-08-14 PROCEDURE — 93798 PHYS/QHP OP CAR RHAB W/ECG: CPT

## 2019-08-16 ENCOUNTER — HOSPITAL ENCOUNTER (OUTPATIENT)
Dept: CARDIAC REHAB | Age: 64
Discharge: HOME | End: 2019-08-16
Payer: OTHER GOVERNMENT

## 2019-08-16 PROCEDURE — 93798 PHYS/QHP OP CAR RHAB W/ECG: CPT

## 2019-08-19 ENCOUNTER — HOSPITAL ENCOUNTER (OUTPATIENT)
Dept: CARDIAC REHAB | Age: 64
Discharge: HOME | End: 2019-08-19
Payer: OTHER GOVERNMENT

## 2019-08-19 PROCEDURE — 93798 PHYS/QHP OP CAR RHAB W/ECG: CPT

## 2019-08-21 ENCOUNTER — HOSPITAL ENCOUNTER (OUTPATIENT)
Dept: CARDIAC REHAB | Age: 64
Discharge: HOME | End: 2019-08-21
Payer: OTHER GOVERNMENT

## 2019-08-21 VITALS — BODY MASS INDEX: 34.02 KG/M2 | WEIGHT: 198.2 LBS

## 2019-08-21 PROCEDURE — 93798 PHYS/QHP OP CAR RHAB W/ECG: CPT

## 2019-08-23 ENCOUNTER — HOSPITAL ENCOUNTER (OUTPATIENT)
Dept: CARDIAC REHAB | Age: 64
Discharge: HOME | End: 2019-08-23
Payer: OTHER GOVERNMENT

## 2019-08-23 PROCEDURE — 93798 PHYS/QHP OP CAR RHAB W/ECG: CPT

## 2019-08-26 ENCOUNTER — HOSPITAL ENCOUNTER (OUTPATIENT)
Dept: CARDIAC REHAB | Age: 64
Discharge: HOME | End: 2019-08-26
Payer: OTHER GOVERNMENT

## 2019-08-26 PROCEDURE — 93798 PHYS/QHP OP CAR RHAB W/ECG: CPT

## 2019-08-28 ENCOUNTER — HOSPITAL ENCOUNTER (OUTPATIENT)
Dept: CARDIAC REHAB | Age: 64
Discharge: HOME | End: 2019-08-28
Payer: OTHER GOVERNMENT

## 2019-08-28 VITALS — BODY MASS INDEX: 34.02 KG/M2 | WEIGHT: 198.2 LBS

## 2019-08-28 PROCEDURE — 93798 PHYS/QHP OP CAR RHAB W/ECG: CPT

## 2019-08-30 ENCOUNTER — HOSPITAL ENCOUNTER (OUTPATIENT)
Dept: CARDIAC REHAB | Age: 64
Discharge: HOME | End: 2019-08-30
Payer: OTHER GOVERNMENT

## 2019-08-30 PROCEDURE — 93798 PHYS/QHP OP CAR RHAB W/ECG: CPT

## 2019-09-04 ENCOUNTER — HOSPITAL ENCOUNTER (OUTPATIENT)
Dept: CARDIAC REHAB | Age: 64
Discharge: HOME | End: 2019-09-04
Payer: OTHER GOVERNMENT

## 2019-09-04 VITALS — BODY MASS INDEX: 34.3 KG/M2 | WEIGHT: 199.8 LBS

## 2019-09-04 PROCEDURE — 93798 PHYS/QHP OP CAR RHAB W/ECG: CPT

## 2019-09-06 ENCOUNTER — HOSPITAL ENCOUNTER (OUTPATIENT)
Dept: CARDIAC REHAB | Age: 64
Discharge: HOME | End: 2019-09-06
Payer: OTHER GOVERNMENT

## 2019-09-06 PROCEDURE — 93798 PHYS/QHP OP CAR RHAB W/ECG: CPT

## 2019-09-09 ENCOUNTER — HOSPITAL ENCOUNTER (OUTPATIENT)
Dept: CARDIAC REHAB | Age: 64
Discharge: HOME | End: 2019-09-09
Payer: OTHER GOVERNMENT

## 2019-09-09 PROCEDURE — 93798 PHYS/QHP OP CAR RHAB W/ECG: CPT

## 2019-09-11 ENCOUNTER — HOSPITAL ENCOUNTER (OUTPATIENT)
Dept: CARDIAC REHAB | Age: 64
Discharge: HOME | End: 2019-09-11
Payer: OTHER GOVERNMENT

## 2019-09-11 VITALS — BODY MASS INDEX: 34.3 KG/M2 | WEIGHT: 199.8 LBS

## 2019-09-11 PROCEDURE — 93798 PHYS/QHP OP CAR RHAB W/ECG: CPT

## 2019-09-13 ENCOUNTER — HOSPITAL ENCOUNTER (OUTPATIENT)
Dept: CARDIAC REHAB | Age: 64
Discharge: HOME | End: 2019-09-13
Payer: OTHER GOVERNMENT

## 2019-09-13 PROCEDURE — 93798 PHYS/QHP OP CAR RHAB W/ECG: CPT

## 2019-09-16 ENCOUNTER — HOSPITAL ENCOUNTER (OUTPATIENT)
Dept: CARDIAC REHAB | Age: 64
Discharge: HOME | End: 2019-09-16
Payer: OTHER GOVERNMENT

## 2019-09-16 PROCEDURE — 93798 PHYS/QHP OP CAR RHAB W/ECG: CPT

## 2019-09-18 ENCOUNTER — HOSPITAL ENCOUNTER (OUTPATIENT)
Dept: CARDIAC REHAB | Age: 64
Discharge: HOME | End: 2019-09-18
Payer: OTHER GOVERNMENT

## 2019-09-18 VITALS — BODY MASS INDEX: 34.67 KG/M2 | WEIGHT: 202 LBS

## 2019-09-18 PROCEDURE — 93798 PHYS/QHP OP CAR RHAB W/ECG: CPT

## 2019-09-20 ENCOUNTER — HOSPITAL ENCOUNTER (OUTPATIENT)
Dept: CARDIAC REHAB | Age: 64
Discharge: HOME | End: 2019-09-20
Payer: OTHER GOVERNMENT

## 2019-09-20 PROCEDURE — 93798 PHYS/QHP OP CAR RHAB W/ECG: CPT

## 2019-09-23 ENCOUNTER — HOSPITAL ENCOUNTER (OUTPATIENT)
Dept: CARDIAC REHAB | Age: 64
Discharge: HOME | End: 2019-09-23
Payer: OTHER GOVERNMENT

## 2019-09-23 PROCEDURE — 93798 PHYS/QHP OP CAR RHAB W/ECG: CPT

## 2019-09-25 ENCOUNTER — HOSPITAL ENCOUNTER (OUTPATIENT)
Dept: CARDIAC REHAB | Age: 64
Discharge: HOME | End: 2019-09-25
Payer: OTHER GOVERNMENT

## 2019-09-25 VITALS — BODY MASS INDEX: 34.64 KG/M2 | WEIGHT: 201.8 LBS

## 2019-09-25 PROCEDURE — 93798 PHYS/QHP OP CAR RHAB W/ECG: CPT

## 2019-09-27 ENCOUNTER — HOSPITAL ENCOUNTER (OUTPATIENT)
Dept: CARDIAC REHAB | Age: 64
Discharge: HOME | End: 2019-09-27
Payer: OTHER GOVERNMENT

## 2019-09-27 PROCEDURE — 93798 PHYS/QHP OP CAR RHAB W/ECG: CPT

## 2019-09-30 ENCOUNTER — HOSPITAL ENCOUNTER (OUTPATIENT)
Dept: CARDIAC REHAB | Age: 64
Discharge: HOME | End: 2019-09-30
Payer: OTHER GOVERNMENT

## 2019-09-30 PROCEDURE — 93798 PHYS/QHP OP CAR RHAB W/ECG: CPT

## 2019-10-02 ENCOUNTER — HOSPITAL ENCOUNTER (OUTPATIENT)
Dept: CARDIAC REHAB | Age: 64
Discharge: HOME | End: 2019-10-02
Payer: OTHER GOVERNMENT

## 2019-10-02 VITALS — WEIGHT: 200.4 LBS | BODY MASS INDEX: 34.4 KG/M2

## 2019-10-02 PROCEDURE — 93798 PHYS/QHP OP CAR RHAB W/ECG: CPT

## 2019-10-04 ENCOUNTER — HOSPITAL ENCOUNTER (OUTPATIENT)
Dept: CARDIAC REHAB | Age: 64
Discharge: HOME | End: 2019-10-04
Payer: OTHER GOVERNMENT

## 2019-10-04 PROCEDURE — 93798 PHYS/QHP OP CAR RHAB W/ECG: CPT

## 2019-10-07 ENCOUNTER — HOSPITAL ENCOUNTER (OUTPATIENT)
Dept: CARDIAC REHAB | Age: 64
Discharge: HOME | End: 2019-10-07
Payer: OTHER GOVERNMENT

## 2019-10-07 PROCEDURE — 93798 PHYS/QHP OP CAR RHAB W/ECG: CPT

## 2019-10-09 ENCOUNTER — HOSPITAL ENCOUNTER (OUTPATIENT)
Dept: CARDIAC REHAB | Age: 64
Discharge: HOME | End: 2019-10-09
Payer: OTHER GOVERNMENT

## 2019-10-09 VITALS — WEIGHT: 199.6 LBS | BODY MASS INDEX: 34.26 KG/M2

## 2019-10-09 PROCEDURE — 93798 PHYS/QHP OP CAR RHAB W/ECG: CPT

## 2019-10-11 ENCOUNTER — HOSPITAL ENCOUNTER (OUTPATIENT)
Dept: CARDIAC REHAB | Age: 64
Discharge: HOME | End: 2019-10-11
Payer: OTHER GOVERNMENT

## 2019-10-11 PROCEDURE — 93798 PHYS/QHP OP CAR RHAB W/ECG: CPT

## 2019-10-14 ENCOUNTER — HOSPITAL ENCOUNTER (OUTPATIENT)
Dept: CARDIAC REHAB | Age: 64
Discharge: HOME | End: 2019-10-14
Payer: OTHER GOVERNMENT

## 2019-10-14 PROCEDURE — 93798 PHYS/QHP OP CAR RHAB W/ECG: CPT

## 2019-10-16 ENCOUNTER — HOSPITAL ENCOUNTER (OUTPATIENT)
Dept: CARDIAC REHAB | Age: 64
Discharge: HOME | End: 2019-10-16
Payer: OTHER GOVERNMENT

## 2019-10-16 VITALS — WEIGHT: 198.6 LBS | BODY MASS INDEX: 34.09 KG/M2

## 2019-10-16 PROCEDURE — 93798 PHYS/QHP OP CAR RHAB W/ECG: CPT

## 2019-10-18 ENCOUNTER — HOSPITAL ENCOUNTER (OUTPATIENT)
Dept: CARDIAC REHAB | Age: 64
Discharge: HOME | End: 2019-10-18
Payer: OTHER GOVERNMENT

## 2019-10-18 PROCEDURE — 93798 PHYS/QHP OP CAR RHAB W/ECG: CPT

## 2019-10-21 ENCOUNTER — HOSPITAL ENCOUNTER (OUTPATIENT)
Dept: CARDIAC REHAB | Age: 64
Discharge: HOME | End: 2019-10-21
Payer: OTHER GOVERNMENT

## 2019-10-21 VITALS — WEIGHT: 198.6 LBS | BODY MASS INDEX: 34.09 KG/M2

## 2019-10-21 PROCEDURE — 93798 PHYS/QHP OP CAR RHAB W/ECG: CPT

## 2019-10-22 PROBLEM — I21.3 ST ELEVATION MYOCARDIAL INFARCTION (STEMI) (HCC): Status: ACTIVE | Noted: 2019-10-22

## 2019-10-23 ENCOUNTER — HOSPITAL ENCOUNTER (OUTPATIENT)
Dept: CARDIAC REHAB | Age: 64
Discharge: HOME | End: 2019-10-23
Payer: OTHER GOVERNMENT

## 2019-10-23 PROCEDURE — 93798 PHYS/QHP OP CAR RHAB W/ECG: CPT

## 2020-01-15 ENCOUNTER — HOSPITAL ENCOUNTER (OUTPATIENT)
Dept: GENERAL RADIOLOGY | Age: 65
Discharge: HOME | End: 2020-01-15
Attending: INTERNAL MEDICINE
Payer: OTHER GOVERNMENT

## 2020-01-15 DIAGNOSIS — M54.9 BACK PAIN: ICD-10-CM

## 2020-01-15 PROCEDURE — 72110 X-RAY EXAM L-2 SPINE 4/>VWS: CPT

## 2020-12-02 ENCOUNTER — TRANSCRIBE ORDER (OUTPATIENT)
Dept: SCHEDULING | Age: 65
End: 2020-12-02

## 2020-12-02 DIAGNOSIS — Z91.89 UNSPECIFIED PERSONAL HISTORY PRESENTING HAZARDS TO HEALTH: Primary | ICD-10-CM

## 2020-12-10 ENCOUNTER — HOSPITAL ENCOUNTER (OUTPATIENT)
Dept: NON INVASIVE DIAGNOSTICS | Age: 65
Discharge: HOME | End: 2020-12-10
Attending: INTERNAL MEDICINE
Payer: OTHER GOVERNMENT

## 2020-12-10 DIAGNOSIS — Z91.89 UNSPECIFIED PERSONAL HISTORY PRESENTING HAZARDS TO HEALTH: ICD-10-CM

## 2020-12-10 PROCEDURE — 93351 STRESS TTE COMPLETE: CPT

## 2021-08-03 PROBLEM — I10 ESSENTIAL HYPERTENSION: Status: RESOLVED | Noted: 2019-07-11 | Resolved: 2021-08-03

## 2022-03-19 PROBLEM — Z98.61 HISTORY OF PTCA: Status: ACTIVE | Noted: 2019-07-18

## 2022-03-19 PROBLEM — I10 ESSENTIAL HYPERTENSION WITH GOAL BLOOD PRESSURE LESS THAN 130/85: Status: ACTIVE | Noted: 2019-07-18

## 2022-03-19 PROBLEM — E78.2 MIXED HYPERLIPIDEMIA: Status: ACTIVE | Noted: 2019-07-11

## 2022-03-20 PROBLEM — I21.3 STEMI (ST ELEVATION MYOCARDIAL INFARCTION) (HCC): Status: ACTIVE | Noted: 2019-07-11

## 2022-03-20 PROBLEM — I21.3 ST ELEVATION MYOCARDIAL INFARCTION (STEMI) (HCC): Status: ACTIVE | Noted: 2019-10-22

## 2023-11-11 ENCOUNTER — HOSPITAL ENCOUNTER (OUTPATIENT)
Dept: GENERAL RADIOLOGY | Age: 68
End: 2023-11-11
Payer: MEDICARE

## 2023-11-11 DIAGNOSIS — M25.511 RIGHT SHOULDER PAIN, UNSPECIFIED CHRONICITY: ICD-10-CM

## 2023-11-11 DIAGNOSIS — M25.552 LEFT HIP PAIN: ICD-10-CM

## 2023-11-11 PROCEDURE — 73030 X-RAY EXAM OF SHOULDER: CPT

## 2023-11-11 PROCEDURE — 73502 X-RAY EXAM HIP UNI 2-3 VIEWS: CPT

## 2024-01-31 NOTE — PROGRESS NOTES
Name: Keli Tejeda  YOB: 1955  Gender: female  MRN: [id number]      CC: Shoulder Pain (R)     HPI: Keli Tejeda is a 68 y.o. female who returns for follow up on her right shoulder. She reports she has seen improvements from her last visit and just has some mild occasional positional discomfort but is overall very pleased with her progress.        Physical Examination:  General: no acute distress  Lungs: breathing easily  CV: regular rhythm by pulse  Right Shoulder: She lacks just a few degrees of terminal elevation actively and passively I can take her there.  Minimal discomfort with impingement testing.  5 out of 5 rotator cuff strength.        Assessment:     ICD-10-CM    1. Impingement syndrome, shoulder, right  M75.41           Plan:   Excellent response to conservative management continue home exercise program she can follow-up with us as needed            Jose Morales MD, FAAOS  Orthopaedics and Sports Medicine

## 2024-02-01 ENCOUNTER — OFFICE VISIT (OUTPATIENT)
Dept: ORTHOPEDIC SURGERY | Age: 69
End: 2024-02-01
Payer: MEDICARE

## 2024-02-01 DIAGNOSIS — M75.41 IMPINGEMENT SYNDROME, SHOULDER, RIGHT: Primary | ICD-10-CM

## 2024-02-01 PROCEDURE — 1090F PRES/ABSN URINE INCON ASSESS: CPT | Performed by: ORTHOPAEDIC SURGERY

## 2024-02-01 PROCEDURE — 1036F TOBACCO NON-USER: CPT | Performed by: ORTHOPAEDIC SURGERY

## 2024-02-01 PROCEDURE — G8417 CALC BMI ABV UP PARAM F/U: HCPCS | Performed by: ORTHOPAEDIC SURGERY

## 2024-02-01 PROCEDURE — 99213 OFFICE O/P EST LOW 20 MIN: CPT | Performed by: ORTHOPAEDIC SURGERY

## 2024-02-01 PROCEDURE — 1123F ACP DISCUSS/DSCN MKR DOCD: CPT | Performed by: ORTHOPAEDIC SURGERY

## 2024-02-01 PROCEDURE — G8484 FLU IMMUNIZE NO ADMIN: HCPCS | Performed by: ORTHOPAEDIC SURGERY

## 2024-02-01 PROCEDURE — 3017F COLORECTAL CA SCREEN DOC REV: CPT | Performed by: ORTHOPAEDIC SURGERY

## 2024-02-01 PROCEDURE — G8428 CUR MEDS NOT DOCUMENT: HCPCS | Performed by: ORTHOPAEDIC SURGERY

## 2024-02-01 PROCEDURE — G8400 PT W/DXA NO RESULTS DOC: HCPCS | Performed by: ORTHOPAEDIC SURGERY

## 2024-05-03 ENCOUNTER — TRANSCRIBE ORDERS (OUTPATIENT)
Dept: SCHEDULING | Age: 69
End: 2024-05-03

## 2024-05-03 DIAGNOSIS — Z12.31 SCREENING MAMMOGRAM FOR HIGH-RISK PATIENT: Primary | ICD-10-CM

## 2024-06-13 ENCOUNTER — HOSPITAL ENCOUNTER (OUTPATIENT)
Dept: MAMMOGRAPHY | Age: 69
Discharge: HOME | End: 2024-06-13
Payer: MEDICARE

## 2024-06-13 DIAGNOSIS — Z12.31 SCREENING MAMMOGRAM FOR HIGH-RISK PATIENT: ICD-10-CM

## 2024-06-13 PROCEDURE — 77063 BREAST TOMOSYNTHESIS BI: CPT

## 2024-08-08 ENCOUNTER — TRANSCRIBE ORDERS (OUTPATIENT)
Dept: SCHEDULING | Age: 69
End: 2024-08-08

## 2024-08-08 DIAGNOSIS — M81.0 MENOPAUSAL OSTEOPOROSIS: Primary | ICD-10-CM

## 2024-08-09 ENCOUNTER — HOSPITAL ENCOUNTER (OUTPATIENT)
Dept: MAMMOGRAPHY | Age: 69
End: 2024-08-09
Attending: INTERNAL MEDICINE
Payer: MEDICARE

## 2024-08-09 DIAGNOSIS — M81.0 MENOPAUSAL OSTEOPOROSIS: ICD-10-CM

## 2024-08-09 PROCEDURE — 77080 DXA BONE DENSITY AXIAL: CPT

## 2025-03-18 ENCOUNTER — OFFICE VISIT (OUTPATIENT)
Age: 70
End: 2025-03-18
Payer: MEDICARE

## 2025-03-18 VITALS
DIASTOLIC BLOOD PRESSURE: 84 MMHG | BODY MASS INDEX: 34.83 KG/M2 | WEIGHT: 204 LBS | HEART RATE: 65 BPM | SYSTOLIC BLOOD PRESSURE: 144 MMHG | HEIGHT: 64 IN

## 2025-03-18 DIAGNOSIS — R07.2 PRECORDIAL CHEST PAIN: ICD-10-CM

## 2025-03-18 DIAGNOSIS — E78.2 MIXED HYPERLIPIDEMIA: ICD-10-CM

## 2025-03-18 DIAGNOSIS — I25.118 CORONARY ARTERY DISEASE INVOLVING NATIVE CORONARY ARTERY OF NATIVE HEART WITH OTHER FORM OF ANGINA PECTORIS: Primary | ICD-10-CM

## 2025-03-18 DIAGNOSIS — I10 ESSENTIAL HYPERTENSION: ICD-10-CM

## 2025-03-18 PROCEDURE — 3079F DIAST BP 80-89 MM HG: CPT | Performed by: INTERNAL MEDICINE

## 2025-03-18 PROCEDURE — 93000 ELECTROCARDIOGRAM COMPLETE: CPT | Performed by: INTERNAL MEDICINE

## 2025-03-18 PROCEDURE — G8427 DOCREV CUR MEDS BY ELIG CLIN: HCPCS | Performed by: INTERNAL MEDICINE

## 2025-03-18 PROCEDURE — 3017F COLORECTAL CA SCREEN DOC REV: CPT | Performed by: INTERNAL MEDICINE

## 2025-03-18 PROCEDURE — 1090F PRES/ABSN URINE INCON ASSESS: CPT | Performed by: INTERNAL MEDICINE

## 2025-03-18 PROCEDURE — G8417 CALC BMI ABV UP PARAM F/U: HCPCS | Performed by: INTERNAL MEDICINE

## 2025-03-18 PROCEDURE — 3077F SYST BP >= 140 MM HG: CPT | Performed by: INTERNAL MEDICINE

## 2025-03-18 PROCEDURE — 99205 OFFICE O/P NEW HI 60 MIN: CPT | Performed by: INTERNAL MEDICINE

## 2025-03-18 PROCEDURE — G8399 PT W/DXA RESULTS DOCUMENT: HCPCS | Performed by: INTERNAL MEDICINE

## 2025-03-18 PROCEDURE — 1123F ACP DISCUSS/DSCN MKR DOCD: CPT | Performed by: INTERNAL MEDICINE

## 2025-03-18 PROCEDURE — 1036F TOBACCO NON-USER: CPT | Performed by: INTERNAL MEDICINE

## 2025-03-18 NOTE — PROGRESS NOTES
[facility] CARDIOLOGY  [address] [place] [address], [address]  [place] [address]  PHONE: [phone]          25    NAME:  Keli Tejeda  : 1955  MRN: [id number]         SUBJECTIVE:   Keli Tejeda is a 69 y.o. female seen for a visit regarding the following:     Chief Complaint   Patient presents with    New Patient     ST Elevation           HPI:      Cardiac problem list:  1.  Coronary artery disease with limited anterior STEMI due to occluded diagonal-managed with PTCA on 2019.  Initial LV gram with EF of 45 to 50%, peak troponin 15  [facility] 2019-normal left main, mid LAD with 10 to 20% disease, first diagonal branch had mid to distal occlusion-s/p PTCA with 2 mm balloon.  Ramus intermedius, circumflex and large dominant RCA were normal.  Moderate anterolateral wall hypokinesis noted with EF of 45 to 50%.  Echo 2019-EF 60 to 65%, mildly dilated LA  2.  Hypertension  3.  Hyperlipidemia  -Previous patient of Dr. Mitesh Cedeno.    I saw Ms. Tejeda who is a pleasant 69-year-old woman in cardiovascular consultation for coronary artery disease with previous anterior STEMI from occluded diagonal managed with PTCA on 2019, hypertension and hyperlipidemia.    CAD: Has had some occasional episodes of chest tightness-central substernal, not exertional, not associated with any diaphoresis or any significant dyspnea.  She has some dyspnea on exertion but she also feels that she is not as fit as she used to be especially since she has not been exercising regularly since COVID times.  She has also gained some weight.  She has not taken any sublingual nitroglycerin for the same discomfort.  She says it is different from what she had when she had a previous STEMI but she is still concerned about these episodes especially with her known coronary artery disease.    Hypertension: Denies headaches blurry vision remains compliant on current therapy with amlodipine 5 mg daily, valsartan 160 mg daily and

## 2025-04-21 ENCOUNTER — RESULTS FOLLOW-UP (OUTPATIENT)
Dept: CARDIOLOGY CLINIC | Age: 70
End: 2025-04-21

## 2025-04-21 NOTE — RESULT ENCOUNTER NOTE
Please let her know that we reviewed her stress test and it came back mildly abnormal.  We need to bring her in to discuss these results and to talk about a possible repeat heart cath.  Please have her keep a log of her heart rates and blood pressures once or twice a day and bring this in with her to her follow-up visit.  Sincerely,  Tomas Interiano MD

## 2025-04-23 NOTE — TELEPHONE ENCOUNTER
----- Message from Dr. Tomas Flood MD sent at 4/21/2025  1:54 PM EDT -----  Please let her know that we reviewed her stress test and it came back mildly abnormal.  We need to bring her in to discuss these results and to talk about a possible repeat heart cath.  Please have her keep a log of her heart rates and blood pressures once or twice a day and bring this in with her to her follow-up visit.  Sincerely,  Tomas Interiano MD

## 2025-04-24 NOTE — TELEPHONE ENCOUNTER
Spoke with pt regarding nuclear stress test results.First available appointment is 6/10/25.Advised pt if we have any cancellations we will let her know.Pt voiced understanding.

## 2025-04-29 ENCOUNTER — OFFICE VISIT (OUTPATIENT)
Age: 70
End: 2025-04-29
Payer: MEDICARE

## 2025-04-29 VITALS
HEIGHT: 64 IN | SYSTOLIC BLOOD PRESSURE: 144 MMHG | WEIGHT: 205 LBS | DIASTOLIC BLOOD PRESSURE: 92 MMHG | BODY MASS INDEX: 35 KG/M2 | HEART RATE: 66 BPM

## 2025-04-29 DIAGNOSIS — M17.0 PRIMARY OSTEOARTHRITIS OF BOTH KNEES: ICD-10-CM

## 2025-04-29 DIAGNOSIS — I25.118 CORONARY ARTERY DISEASE INVOLVING NATIVE CORONARY ARTERY OF NATIVE HEART WITH OTHER FORM OF ANGINA PECTORIS: Primary | ICD-10-CM

## 2025-04-29 DIAGNOSIS — I10 ESSENTIAL HYPERTENSION: ICD-10-CM

## 2025-04-29 DIAGNOSIS — E78.2 MIXED HYPERLIPIDEMIA: ICD-10-CM

## 2025-04-29 PROCEDURE — G8417 CALC BMI ABV UP PARAM F/U: HCPCS | Performed by: INTERNAL MEDICINE

## 2025-04-29 PROCEDURE — 1126F AMNT PAIN NOTED NONE PRSNT: CPT | Performed by: INTERNAL MEDICINE

## 2025-04-29 PROCEDURE — 3080F DIAST BP >= 90 MM HG: CPT | Performed by: INTERNAL MEDICINE

## 2025-04-29 PROCEDURE — G8399 PT W/DXA RESULTS DOCUMENT: HCPCS | Performed by: INTERNAL MEDICINE

## 2025-04-29 PROCEDURE — G8427 DOCREV CUR MEDS BY ELIG CLIN: HCPCS | Performed by: INTERNAL MEDICINE

## 2025-04-29 PROCEDURE — 1036F TOBACCO NON-USER: CPT | Performed by: INTERNAL MEDICINE

## 2025-04-29 PROCEDURE — 1123F ACP DISCUSS/DSCN MKR DOCD: CPT | Performed by: INTERNAL MEDICINE

## 2025-04-29 PROCEDURE — 1090F PRES/ABSN URINE INCON ASSESS: CPT | Performed by: INTERNAL MEDICINE

## 2025-04-29 PROCEDURE — 99214 OFFICE O/P EST MOD 30 MIN: CPT | Performed by: INTERNAL MEDICINE

## 2025-04-29 PROCEDURE — 1160F RVW MEDS BY RX/DR IN RCRD: CPT | Performed by: INTERNAL MEDICINE

## 2025-04-29 PROCEDURE — 1159F MED LIST DOCD IN RCRD: CPT | Performed by: INTERNAL MEDICINE

## 2025-04-29 PROCEDURE — 3017F COLORECTAL CA SCREEN DOC REV: CPT | Performed by: INTERNAL MEDICINE

## 2025-04-29 PROCEDURE — 3077F SYST BP >= 140 MM HG: CPT | Performed by: INTERNAL MEDICINE

## 2025-04-29 RX ORDER — METOPROLOL SUCCINATE 50 MG/1
50 TABLET, EXTENDED RELEASE ORAL 2 TIMES DAILY
Qty: 180 TABLET | Refills: 3 | Status: SHIPPED | OUTPATIENT
Start: 2025-04-29

## 2025-04-29 RX ORDER — CELECOXIB 200 MG/1
200 CAPSULE ORAL 2 TIMES DAILY
Qty: 180 CAPSULE | Refills: 3 | Status: SHIPPED | OUTPATIENT
Start: 2025-04-29

## 2025-04-29 RX ORDER — AMLODIPINE BESYLATE 5 MG/1
5 TABLET ORAL DAILY
Qty: 90 TABLET | Refills: 3 | Status: SHIPPED | OUTPATIENT
Start: 2025-04-29

## 2025-04-29 RX ORDER — ROSUVASTATIN CALCIUM 40 MG/1
40 TABLET, COATED ORAL EVERY EVENING
Qty: 90 TABLET | Refills: 3 | Status: SHIPPED | OUTPATIENT
Start: 2025-04-29

## 2025-04-29 RX ORDER — VALSARTAN 320 MG/1
320 TABLET ORAL DAILY
Qty: 90 TABLET | Refills: 3 | Status: SHIPPED | OUTPATIENT
Start: 2025-04-29

## 2025-04-29 RX ORDER — METOPROLOL SUCCINATE 50 MG/1
50 TABLET, EXTENDED RELEASE ORAL 2 TIMES DAILY
COMMUNITY
End: 2025-04-29 | Stop reason: SDUPTHER

## 2025-04-29 RX ORDER — CLOPIDOGREL BISULFATE 75 MG/1
75 TABLET ORAL DAILY
Qty: 90 TABLET | Refills: 3 | Status: SHIPPED | OUTPATIENT
Start: 2025-04-29

## 2025-04-29 ASSESSMENT — ENCOUNTER SYMPTOMS
HEMATEMESIS: 0
EYE REDNESS: 0
HEMOPTYSIS: 0
DOUBLE VISION: 0
HOARSE VOICE: 0
WHEEZING: 0
ABDOMINAL PAIN: 0
HEMATOCHEZIA: 0
STRIDOR: 0

## 2025-04-29 NOTE — PROGRESS NOTES
[facility] CARDIOLOGY  [address] [place] [address], [address]  [place] [address]  PHONE: [phone]          25    NAME:  Keli Tejeda  : 1955  MRN: [id number]         SUBJECTIVE:   Keli Tejeda is a 69 y.o. female seen for a visit regarding the following:     Chief Complaint   Patient presents with    Results     Abnormal NST           HPI:      Cardiac problem list:  1.  Coronary artery disease with limited anterior STEMI due to occluded diagonal-managed with PTCA on 2019.  Initial LV gram with EF of 45 to 50%, peak troponin 15  NST-2025-EF 65%-reported to be abnormal with evidence of inducible ischemia-small apical reversible  [facility] 2019-normal left main, mid LAD with 10 to 20% disease, first diagonal branch had mid to distal occlusion-s/p PTCA with 2 mm balloon.  Ramus intermedius, circumflex and large dominant RCA were normal.  Moderate anterolateral wall hypokinesis noted with EF of 45 to 50%.  Echo 2019-EF 60 to 65%, mildly dilated LA  2.  Hypertension  3.  Hyperlipidemia  -Previous patient of Dr. Mitesh Cedeno.    I saw Ms. Tejeda who is a pleasant 69-year-old woman in cardiovascular consultation for coronary artery disease with previous anterior STEMI from occluded diagonal managed with PTCA on 2019, hypertension and hyperlipidemia.    CAD: When we last met with her, we set her up for a nuclear stress test which came back mildly abnormal with a small area of apical reversible ischemia but she has no complaints of chest pain at all at this point.  She has sublingual nitroglycerin but has not taken any.  Her previous atypical episodes of chest pain have gone away completely and she is walking although not every day but is not symptomatic with exertional chest pain.    Hypertension: Denies headaches blurry vision remains compliant on current therapy with amlodipine 5 mg daily, valsartan 160 mg daily and Toprol-XL 50 mg twice daily.  Pressures are borderline elevated here.